# Patient Record
Sex: MALE | Race: WHITE | Employment: FULL TIME | URBAN - METROPOLITAN AREA
[De-identification: names, ages, dates, MRNs, and addresses within clinical notes are randomized per-mention and may not be internally consistent; named-entity substitution may affect disease eponyms.]

---

## 2017-07-10 PROBLEM — F41.1 GENERALIZED ANXIETY DISORDER: Status: ACTIVE | Noted: 2017-07-10

## 2017-09-08 PROBLEM — F33.41 RECURRENT MAJOR DEPRESSIVE DISORDER, IN PARTIAL REMISSION: Status: ACTIVE | Noted: 2017-09-08

## 2017-09-08 PROBLEM — F33.41 RECURRENT MAJOR DEPRESSIVE DISORDER, IN PARTIAL REMISSION (HCC): Status: ACTIVE | Noted: 2017-09-08

## 2019-09-03 NOTE — PROGRESS NOTES
HPI:   Marc Hagen is a 30year old male that presents for Patient presents with:  New Patient: Patient will like to talk about his anxiety.    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 He denies ever having suicidal thoughts  But does have a lot of anxiety and panic attacks and depression. Past medical, surgical, family and social history reviewed in detail with patient.   Updated where appropria rales/rhonchi/wheezing. ABDOMEN:  Soft, nondistended, nontender, bowel sounds normal in all 4 quadrants, no masses, no hepatosplenomegaly. EXTREMITIES:  No edema, no cyanosis, 2+ radial pulses b/l.    NEURO:  Grossly normal   PSYCH: Mood and affect are no

## 2019-09-23 RX ORDER — VENLAFAXINE HYDROCHLORIDE 75 MG/1
75 CAPSULE, EXTENDED RELEASE ORAL DAILY
Qty: 30 CAPSULE | Refills: 0 | Status: SHIPPED | OUTPATIENT
Start: 2019-09-23 | End: 2019-10-23

## 2019-09-23 NOTE — TELEPHONE ENCOUNTER
Venlafaxine HCl ER (EFFEXOR XR) 37.5 MG Oral Capsule SR 24 Hr    Patient states he is out of his medication, has an appt Wed. Should he be off it for 2 days until the appt or can we refill it? Please advise.     Future Appointments   Date Time Provider Jamala

## 2019-09-23 NOTE — TELEPHONE ENCOUNTER
Spoke to pt, states he ran out of his Venlafaxine yesterday afternoon. Last OV 9/3/19  1. Generalized anxiety disorder  2.  Recurrent major depressive disorder, in partial remission ([facility] [address]  Patient was counseled at great length regarding depression and an

## 2019-09-25 ENCOUNTER — TELEPHONE (OUTPATIENT)
Dept: FAMILY MEDICINE CLINIC | Facility: CLINIC | Age: 29
End: 2019-09-25

## 2019-09-30 PROBLEM — I10 ESSENTIAL HYPERTENSION: Status: ACTIVE | Noted: 2019-09-30

## 2019-09-30 NOTE — PROGRESS NOTES
HPI:   Oscar Barriga is a 30year old male that presents for Patient presents with:  Medication Follow-Up: Effexor- 3 week follow up. Has been working out well. Imm/Inj: Declined flu vaccine  Blood Pressure: Will like to get eveluated for it.  He's due f normal without erythema or effusion   Nose: patent, no nasal discharge    Throat:  No tonsillar erythema or exudate. Mouth:  No oral lesions or ulcerations, good dentition. NECK: Supple, no cervical LAD, no thyromegaly.   SKIN: No rashes, no skin lesio

## 2019-10-15 ENCOUNTER — TELEPHONE (OUTPATIENT)
Dept: FAMILY MEDICINE CLINIC | Facility: CLINIC | Age: 29
End: 2019-10-15

## 2019-10-23 ENCOUNTER — OFFICE VISIT (OUTPATIENT)
Dept: FAMILY MEDICINE CLINIC | Facility: CLINIC | Age: 29
End: 2019-10-23
Payer: COMMERCIAL

## 2019-10-23 VITALS
WEIGHT: 204.38 LBS | DIASTOLIC BLOOD PRESSURE: 85 MMHG | OXYGEN SATURATION: 99 % | BODY MASS INDEX: 29.26 KG/M2 | HEIGHT: 70 IN | SYSTOLIC BLOOD PRESSURE: 149 MMHG | RESPIRATION RATE: 16 BRPM | TEMPERATURE: 99 F | HEART RATE: 77 BPM

## 2019-10-23 DIAGNOSIS — Z00.00 ROUTINE PHYSICAL EXAMINATION: ICD-10-CM

## 2019-10-23 DIAGNOSIS — I10 ESSENTIAL HYPERTENSION: Primary | ICD-10-CM

## 2019-10-23 PROCEDURE — 99213 OFFICE O/P EST LOW 20 MIN: CPT | Performed by: FAMILY MEDICINE

## 2019-10-23 RX ORDER — VENLAFAXINE HYDROCHLORIDE 150 MG/1
150 CAPSULE, EXTENDED RELEASE ORAL DAILY
Qty: 30 CAPSULE | Refills: 1 | Status: SHIPPED | OUTPATIENT
Start: 2019-10-23 | End: 2019-12-30

## 2019-10-24 NOTE — PROGRESS NOTES
HPI:   Olamide So is a 30year old male that presents for Patient presents with:  Medication Follow-Up: Irbesartan 75 mg daily. Patient stated he forgot to pick it up the day when it was prescribed. Has only been taking medication for a week.  Is due for scleral icterus, conjunctivae clear, no eye discharge    Ears: External normal. TMs normal without erythema or effusion   Nose: patent, no nasal discharge    Throat:  No tonsillar erythema or exudate.      Mouth:  No oral lesions or ulcerations, good dentit

## 2019-11-15 ENCOUNTER — OFFICE VISIT (OUTPATIENT)
Dept: FAMILY MEDICINE CLINIC | Facility: CLINIC | Age: 29
End: 2019-11-15
Payer: COMMERCIAL

## 2019-11-15 VITALS
BODY MASS INDEX: 28.87 KG/M2 | WEIGHT: 201.63 LBS | RESPIRATION RATE: 16 BRPM | SYSTOLIC BLOOD PRESSURE: 137 MMHG | TEMPERATURE: 99 F | DIASTOLIC BLOOD PRESSURE: 80 MMHG | HEIGHT: 70 IN | HEART RATE: 75 BPM

## 2019-11-15 DIAGNOSIS — F33.41 RECURRENT MAJOR DEPRESSIVE DISORDER, IN PARTIAL REMISSION (HCC): ICD-10-CM

## 2019-11-15 DIAGNOSIS — I10 ESSENTIAL HYPERTENSION: ICD-10-CM

## 2019-11-15 DIAGNOSIS — F41.1 GENERALIZED ANXIETY DISORDER: ICD-10-CM

## 2019-11-15 DIAGNOSIS — Z00.00 ROUTINE ADULT HEALTH MAINTENANCE: Primary | ICD-10-CM

## 2019-11-15 DIAGNOSIS — Z72.89 ALCOHOL USE: ICD-10-CM

## 2019-11-15 PROCEDURE — 99214 OFFICE O/P EST MOD 30 MIN: CPT | Performed by: FAMILY MEDICINE

## 2019-11-15 PROCEDURE — 99395 PREV VISIT EST AGE 18-39: CPT | Performed by: FAMILY MEDICINE

## 2019-11-15 RX ORDER — IRBESARTAN 150 MG/1
150 TABLET ORAL NIGHTLY
Qty: 90 TABLET | Refills: 0 | Status: SHIPPED | OUTPATIENT
Start: 2019-11-15 | End: 2020-02-12

## 2019-11-15 NOTE — PROGRESS NOTES
Maia Mota is a 30year old male who presents for a complete physical exam.   HPI:   Patient presents with:  Physical: Routine annual physical.Has labs pending in chart from 10/23/19  Refill Request:  Irbesartan 75 mg- Patient ran out of medication 4 d 0.10 10*3/uL    nRBC Absolute 0.000 0.000 - 0.012 10*3/uL    Neutrophils % 54.3 %    Lymphocytes % 33.9 %    Monocytes % 8.7 %    Eosinophils % 2.3 %    Basophils % 0.8 %    nRBC/100 WBC 0.00 /100WBC       Current Outpatient Medications   Medication Alfredo Oconnell headaches/dizziness  PSYCH: denies depression or anxiety  HEMATOLOGIC: denies easy bruising/bleeding/anemia/blood clot disorders  ENDOCRINE: denies weight gain/weight loss/enlargement of neck glands/polyuria/polydypsia  ALL/ASTHMA: denies allergic rhinitis 08/30/1993    Deferred                Date(s) Deferred    FLULAVAL 6 months & older 0.5 ml Prefilled syringe ([id number])                          11/15/2019      ASSESSMENT AND PLAN:   Aysha Christiansen is a 30year old male who presents for a complete physical e

## 2019-11-18 PROBLEM — Z72.89 ALCOHOL USE: Status: ACTIVE | Noted: 2019-11-18

## 2019-11-18 PROBLEM — Z78.9 ALCOHOL USE: Status: ACTIVE | Noted: 2019-11-18

## 2019-11-18 PROBLEM — F10.90 ALCOHOL USE: Status: ACTIVE | Noted: 2019-11-18

## 2019-11-25 ENCOUNTER — OFFICE VISIT (OUTPATIENT)
Dept: FAMILY MEDICINE CLINIC | Facility: CLINIC | Age: 29
End: 2019-11-25
Payer: COMMERCIAL

## 2019-11-25 ENCOUNTER — LAB ENCOUNTER (OUTPATIENT)
Dept: LAB | Age: 29
End: 2019-11-25
Attending: FAMILY MEDICINE
Payer: COMMERCIAL

## 2019-11-25 VITALS
HEART RATE: 80 BPM | DIASTOLIC BLOOD PRESSURE: 80 MMHG | RESPIRATION RATE: 16 BRPM | TEMPERATURE: 98 F | WEIGHT: 203.63 LBS | HEIGHT: 70 IN | SYSTOLIC BLOOD PRESSURE: 125 MMHG | BODY MASS INDEX: 29.15 KG/M2

## 2019-11-25 DIAGNOSIS — F41.1 GENERALIZED ANXIETY DISORDER: ICD-10-CM

## 2019-11-25 DIAGNOSIS — I10 ESSENTIAL HYPERTENSION: Primary | ICD-10-CM

## 2019-11-25 DIAGNOSIS — Z00.00 ROUTINE PHYSICAL EXAMINATION: ICD-10-CM

## 2019-11-25 PROCEDURE — 36415 COLL VENOUS BLD VENIPUNCTURE: CPT | Performed by: FAMILY MEDICINE

## 2019-11-25 PROCEDURE — 99214 OFFICE O/P EST MOD 30 MIN: CPT | Performed by: FAMILY MEDICINE

## 2019-11-25 PROCEDURE — 80050 GENERAL HEALTH PANEL: CPT | Performed by: FAMILY MEDICINE

## 2019-11-25 PROCEDURE — 84439 ASSAY OF FREE THYROXINE: CPT | Performed by: FAMILY MEDICINE

## 2019-11-25 PROCEDURE — 80061 LIPID PANEL: CPT | Performed by: FAMILY MEDICINE

## 2019-11-25 NOTE — PROGRESS NOTES
HPI:   Paul Funez is a 30year old male that presents for blood pressure check. The patient is taking Irbesartan 150 mg daily. He took medication early today. He has been fasting today and denies caffeine intake.     The patient refuses flu vaccine cervical LAD, no thyromegaly. SKIN: No rashes, no skin lesion, no bruising, good turgor. HEART:  Regular rate and rhythm, no murmurs, rubs or gallops. LUNGS: Clear to auscultation bilterally, no rales/rhonchi/wheezing.   ABDOMEN:  Soft, nondistended, non

## 2019-12-05 DIAGNOSIS — E78.00 HIGH CHOLESTEROL: Primary | ICD-10-CM

## 2019-12-30 ENCOUNTER — TELEPHONE (OUTPATIENT)
Dept: FAMILY MEDICINE CLINIC | Facility: CLINIC | Age: 29
End: 2019-12-30

## 2019-12-30 RX ORDER — VENLAFAXINE HYDROCHLORIDE 150 MG/1
150 CAPSULE, EXTENDED RELEASE ORAL DAILY
Qty: 30 CAPSULE | Refills: 0 | Status: SHIPPED | OUTPATIENT
Start: 2019-12-30 | End: 2020-02-05

## 2019-12-30 NOTE — TELEPHONE ENCOUNTER
Venlafaxine HCl  MG Oral Capsule SR 24 Hr    [facility] #[id number] - [address] AT [facility], [phone], [phone]  ________________________________________    Pt would like a phone call after it has be

## 2019-12-30 NOTE — TELEPHONE ENCOUNTER
10/23/19 last refill #30 (1)  11/25/19 OV Dr. Max Jones Gen. Anxiety disorder/HTN partial notes:  Generalized anxiety disorder  Controlled. Continue Effexor. 1/15/2020 SCHEDULED OV follow up Dr. Max Jones. Sent RX. Task completed.

## 2020-01-15 ENCOUNTER — TELEPHONE (OUTPATIENT)
Dept: FAMILY MEDICINE CLINIC | Facility: CLINIC | Age: 30
End: 2020-01-15

## 2020-01-15 NOTE — TELEPHONE ENCOUNTER
Patient was a no show for his appt today with Dr. Dylan Spencer. LMOM for patient to call office to reschedule if needed, and to let him know that there will be a $40 no show fee charged for the missed visit.

## 2020-02-05 RX ORDER — VENLAFAXINE HYDROCHLORIDE 150 MG/1
CAPSULE, EXTENDED RELEASE ORAL
Qty: 30 CAPSULE | Refills: 0 | Status: SHIPPED | OUTPATIENT
Start: 2020-02-05 | End: 2020-03-06

## 2020-02-05 NOTE — TELEPHONE ENCOUNTER
A follow-up appointment needed to check blood pressure. Patient has missed his last appointment on 1/15/2020.

## 2020-02-06 NOTE — TELEPHONE ENCOUNTER
Future Appointments   Date Time Provider Ramana Faina   2/8/2020 10:00 AM Javy Leonard MD [facility] [address] [facility] [address]

## 2020-02-12 ENCOUNTER — OFFICE VISIT (OUTPATIENT)
Dept: FAMILY MEDICINE CLINIC | Facility: CLINIC | Age: 30
End: 2020-02-12
Payer: COMMERCIAL

## 2020-02-12 VITALS
BODY MASS INDEX: 28.75 KG/M2 | RESPIRATION RATE: 16 BRPM | TEMPERATURE: 98 F | WEIGHT: 200.81 LBS | HEIGHT: 70 IN | OXYGEN SATURATION: 99 % | DIASTOLIC BLOOD PRESSURE: 86 MMHG | SYSTOLIC BLOOD PRESSURE: 142 MMHG | HEART RATE: 80 BPM

## 2020-02-12 DIAGNOSIS — I10 ESSENTIAL HYPERTENSION: Primary | ICD-10-CM

## 2020-02-12 PROCEDURE — 99213 OFFICE O/P EST LOW 20 MIN: CPT | Performed by: FAMILY MEDICINE

## 2020-02-12 RX ORDER — IRBESARTAN 150 MG/1
150 TABLET ORAL DAILY
Qty: 90 TABLET | Refills: 0 | Status: SHIPPED | OUTPATIENT
Start: 2020-02-12 | End: 2020-04-13

## 2020-02-12 RX ORDER — HYDROCHLOROTHIAZIDE 12.5 MG/1
12.5 TABLET ORAL DAILY
Qty: 30 TABLET | Refills: 0 | Status: SHIPPED | OUTPATIENT
Start: 2020-02-12 | End: 2020-04-13

## 2020-02-12 NOTE — PROGRESS NOTES
HPI:   Dayanna Antonio is a 30year old male that presents for blood pressure check. Pt is here for a 2 month f/u. Pt is currently taking Irbesartan 150 mg qd. Pt was aware BP would be elevated today (156/90). Pt claims to take his medication regularly.  Pt no skin lesion, no bruising, good turgor. HEART:  Regular rate and rhythm, no murmurs, rubs or gallops. LUNGS: Clear to auscultation bilterally, no rales/rhonchi/wheezing.   ABDOMEN:  Soft, nondistended, nontender, bowel sounds normal in all 4 quadrants,

## 2020-03-06 RX ORDER — VENLAFAXINE HYDROCHLORIDE 150 MG/1
CAPSULE, EXTENDED RELEASE ORAL
Qty: 30 CAPSULE | Refills: 0 | Status: SHIPPED | OUTPATIENT
Start: 2020-03-06 | End: 2020-04-13

## 2020-03-06 NOTE — TELEPHONE ENCOUNTER
Name from pharmacy: VENLAFAXINE 150MG ER CAPSULES          Will file in chart as: VENLAFAXINE HCL  MG Oral Capsule SR 24 Hr         Sig: TAKE 1 CAPSULE BY MOUTH DAILY    Disp:  30 capsule    Refills:  0 (Pharmacy requested: Not specified)    Start: 3

## 2020-03-30 ENCOUNTER — TELEPHONE (OUTPATIENT)
Dept: FAMILY MEDICINE CLINIC | Facility: CLINIC | Age: 30
End: 2020-03-30

## 2020-03-30 DIAGNOSIS — Z20.822 EXPOSURE TO 2019 NOVEL CORONAVIRUS: ICD-10-CM

## 2020-03-30 DIAGNOSIS — R05.9 COUGH: Primary | ICD-10-CM
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
## 2020-03-30 NOTE — TELEPHONE ENCOUNTER
- Pt was informed that he may have been exposed to virus at work. Pt works for [employer] in [facility]. Pt did not go to work today and would like to be placed on 14 day quarentine. Please call to advise.   [phone]

## 2020-03-31 ENCOUNTER — TELEPHONE (OUTPATIENT)
Dept: FAMILY MEDICINE CLINIC | Facility: CLINIC | Age: 30
End: 2020-03-31

## 2020-03-31 ENCOUNTER — HOSPITAL ENCOUNTER (EMERGENCY)
Facility: HOSPITAL | Age: 30
Discharge: HOME | End: 2020-03-31
Attending: EMERGENCY MEDICINE
Payer: COMMERCIAL

## 2020-03-31 ENCOUNTER — APPOINTMENT (OUTPATIENT)
Dept: GENERAL RADIOLOGY | Facility: HOSPITAL | Age: 30
End: 2020-03-31
Attending: PHYSICIAN ASSISTANT
Payer: COMMERCIAL

## 2020-03-31 VITALS
DIASTOLIC BLOOD PRESSURE: 99 MMHG | RESPIRATION RATE: 18 BRPM | HEIGHT: 71 IN | OXYGEN SATURATION: 98 % | BODY MASS INDEX: 28 KG/M2 | TEMPERATURE: 98 F | SYSTOLIC BLOOD PRESSURE: 146 MMHG | WEIGHT: 200 LBS | HEART RATE: 104 BPM

## 2020-03-31 DIAGNOSIS — J45.41 MODERATE PERSISTENT ASTHMA WITH EXACERBATION: Primary | ICD-10-CM

## 2020-03-31 DIAGNOSIS — Z20.828 CONTACT WITH OR EXPOSURE TO VIRAL DISEASE: ICD-10-CM

## 2020-03-31 PROCEDURE — 99284 EMERGENCY DEPT VISIT MOD MDM: CPT

## 2020-03-31 PROCEDURE — 71045 X-RAY EXAM CHEST 1 VIEW: CPT | Performed by: PHYSICIAN ASSISTANT

## 2020-03-31 PROCEDURE — 87999 UNLISTED MICROBIOLOGY PX: CPT

## 2020-03-31 PROCEDURE — 99283 EMERGENCY DEPT VISIT LOW MDM: CPT

## 2020-03-31 RX ORDER — DEXAMETHASONE SODIUM PHOSPHATE 4 MG/ML
16 INJECTION, SOLUTION INTRA-ARTICULAR; INTRALESIONAL; INTRAMUSCULAR; INTRAVENOUS; SOFT TISSUE ONCE
Status: COMPLETED | OUTPATIENT
Start: 2020-03-31 | End: 2020-03-31

## 2020-03-31 RX ORDER — ALBUTEROL SULFATE 90 UG/1
2 AEROSOL, METERED RESPIRATORY (INHALATION) EVERY 6 HOURS PRN
COMMUNITY

## 2020-03-31 RX ORDER — ALBUTEROL SULFATE 90 UG/1
2 AEROSOL, METERED RESPIRATORY (INHALATION) EVERY 4 HOURS PRN
Qty: 1 INHALER | Refills: 0 | Status: SHIPPED | OUTPATIENT
Start: 2020-03-31 | End: 2020-04-30

## 2020-03-31 RX ORDER — MONTELUKAST SODIUM 10 MG/1
10 TABLET ORAL NIGHTLY
Qty: 30 TABLET | Refills: 0 | Status: SHIPPED | OUTPATIENT
Start: 2020-03-31

## 2020-03-31 RX ORDER — PREDNISONE 20 MG/1
40 TABLET ORAL DAILY
Qty: 8 TABLET | Refills: 0 | Status: SHIPPED | OUTPATIENT
Start: 2020-03-31 | End: 2020-04-04

## 2020-03-31 RX ORDER — ALBUTEROL SULFATE 90 UG/1
2 AEROSOL, METERED RESPIRATORY (INHALATION) 4 TIMES DAILY
Status: DISCONTINUED | OUTPATIENT
Start: 2020-03-31 | End: 2020-03-31
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
## 2020-03-31 NOTE — TELEPHONE ENCOUNTER
I spoke to patient and explained that just because the doctor ordered the Covid-19 test, that does not mean he will get the test. The order goes to [facility] and they prioritize the patients who do need to be tested.  I gave him the health department number [de-identified]

## 2020-03-31 NOTE — TELEPHONE ENCOUNTER
Pt called to let Dr. Angel Earl know that he would be going for testing today at the hospital.   I transferred the call to [place] to find out more information on the procedure for the testing.       Patient called back and stated that he spoke to someone at [facility],

## 2020-03-31 NOTE — TELEPHONE ENCOUNTER
I spoke to patient who said he called [facility] and they told him to follow what his doctor said, he is going over to [facility] for testing.  I told him they will not just do the test without screening first. He said I am going to follow my doctors orde

## 2020-03-31 NOTE — ED PROVIDER NOTES
Patient Seen in: [facility] Emergency Department      History   Patient presents with:  Cough/URI    Stated Complaint: Pt requesting to be tested for covid. Afebrile, cough for 2 days.  denies travel*    HPI    57-year-old male with a history of asth Resp 18   Ht 180.3 cm (5' 11\")   Wt 90.7 kg   SpO2 98%   BMI 27.89 kg/m²         Physical Exam  Vitals signs and nursing note reviewed. Constitutional:       Appearance: He is well-developed. HENT:      Head: Normocephalic. Jaw:  There is normal effusion, or pneumothorax. CONCLUSION:  No focal consolidation.     Dictated by: Esperanza Ohara MD on 3/31/2020 at 2:42 PM     Finalized by: Esperanza Ohara MD on 3/31/2020 at 2:42 PM            Wearing N95 mask thru out visit          MDM     Clinical Impression:

## 2020-04-02 ENCOUNTER — TELEPHONE (OUTPATIENT)
Dept: FAMILY MEDICINE CLINIC | Facility: CLINIC | Age: 30
End: 2020-04-02

## 2020-04-02 NOTE — TELEPHONE ENCOUNTER
Informed pt of Dr. Guardado Aid letter sent via [address] with return to work of 4/2/20 date. Pt expressed understanding and agreement, but is concerned about missing Monday 3/30/20 work.  Pt states he will bring into work along with Dr. Guardado Aid letter the dis

## 2020-04-02 NOTE — TELEPHONE ENCOUNTER
Pt calling in regards to needing a note to return to work, stated he tested negative for the corona virus , please call pt

## 2020-04-02 NOTE — TELEPHONE ENCOUNTER
See attached phone message. 3/31/20 lab:  Notes recorded by YOBANY Vaughn on 2020 at 6:00 PM CDT  COVID result received and reviewed. Called patient and verified name and . Results are negative.  Informed patient that they should call primary c

## 2020-04-12 RX ORDER — VENLAFAXINE HYDROCHLORIDE 150 MG/1
CAPSULE, EXTENDED RELEASE ORAL
Qty: 30 CAPSULE | Refills: 0 | Status: CANCELLED | OUTPATIENT
Start: 2020-04-12

## 2020-04-13 RX ORDER — VENLAFAXINE HYDROCHLORIDE 150 MG/1
CAPSULE, EXTENDED RELEASE ORAL
Qty: 90 CAPSULE | Refills: 0 | Status: SHIPPED | OUTPATIENT
Start: 2020-04-13 | End: 2020-07-14

## 2020-04-13 RX ORDER — VENLAFAXINE HYDROCHLORIDE 75 MG/1
CAPSULE, EXTENDED RELEASE ORAL
Qty: 30 CAPSULE | Refills: 0 | OUTPATIENT
Start: 2020-04-13

## 2020-04-13 RX ORDER — HYDROCHLOROTHIAZIDE 12.5 MG/1
TABLET ORAL
Qty: 90 TABLET | Refills: 0 | Status: SHIPPED | OUTPATIENT
Start: 2020-04-13

## 2020-04-13 RX ORDER — IRBESARTAN 150 MG/1
TABLET ORAL
Qty: 90 TABLET | Refills: 0 | Status: SHIPPED | OUTPATIENT
Start: 2020-04-13 | End: 2020-07-14

## 2020-04-13 NOTE — TELEPHONE ENCOUNTER
Name from pharmacy: VENLAFAXINE 150MG ER CAPSULES          Will file in chart as: VENLAFAXINE HCL  MG Oral Capsule SR 24 Hr         Sig: TAKE ONE CAPSULE BY MOUTH DAILY    Disp:  30 capsule    Refills:  0 (Pharmacy requested: Not specified)    Start: LOV 2/12/20

## 2020-04-22 ENCOUNTER — VIRTUAL PHONE E/M (OUTPATIENT)
Dept: FAMILY MEDICINE CLINIC | Facility: CLINIC | Age: 30
End: 2020-04-22
Payer: COMMERCIAL

## 2020-04-22 DIAGNOSIS — K92.0 HEMATEMESIS WITH NAUSEA: Primary | ICD-10-CM

## 2020-04-22 PROCEDURE — 99213 OFFICE O/P EST LOW 20 MIN: CPT | Performed by: FAMILY MEDICINE

## 2020-04-22 NOTE — PROGRESS NOTES
Virtual Telephone Check-In    TELEMEDICINE VISIT by phone  This visit is conducted using Telemedicine with live, interactive video and/or audio.     Verification of patient identity was established by the  patient (s)  Manjeet Singleton Oral Tab, Take 1 tablet (10 mg total) by mouth nightly., Disp: 30 tablet, Rfl: 0  •  Albuterol Sulfate  (90 Base) MCG/ACT Inhalation Aero Soln, Inhale 2 puffs into the lungs every 4 (four) hours as needed for Wheezing., Disp: 1 Inhaler, Rfl: 0    RE as detailed in the plan of care above.

## 2020-07-14 RX ORDER — VENLAFAXINE HYDROCHLORIDE 150 MG/1
CAPSULE, EXTENDED RELEASE ORAL
Qty: 90 CAPSULE | Refills: 1 | Status: SHIPPED | OUTPATIENT
Start: 2020-07-14 | End: 2021-01-28

## 2020-07-14 RX ORDER — IRBESARTAN 150 MG/1
TABLET ORAL
Qty: 90 TABLET | Refills: 1 | Status: SHIPPED | OUTPATIENT
Start: 2020-07-14

## 2020-07-14 NOTE — TELEPHONE ENCOUNTER
Hypertension Medications Protocol Passed7/12 3:18 PM   CMP or BMP in past 12 months    Last serum creatinine< 2.0    Appointment in past 6 or next 3 months     Requesting IRBESARTAN 150 MG, VENLAFAXINE HCL  MG   LOV: 4/22/20 (virtual)  RTC:   Last Re

## 2020-07-29 ENCOUNTER — VIRTUAL PHONE E/M (OUTPATIENT)
Dept: FAMILY MEDICINE CLINIC | Facility: CLINIC | Age: 30
End: 2020-07-29

## 2020-07-29 DIAGNOSIS — Z02.9 ADMINISTRATIVE ENCOUNTER: Primary | ICD-10-CM

## 2020-08-10 ENCOUNTER — TELEPHONE (OUTPATIENT)
Dept: FAMILY MEDICINE CLINIC | Facility: CLINIC | Age: 30
End: 2020-08-10

## 2020-08-10 NOTE — TELEPHONE ENCOUNTER
Future Appointments   Date Time Provider Ramana Eisenberg   8/12/2020  4:45 PM Hallie Bruner MD [facility] [address] [facility] [address]     Patient verbally consents to a virtual/telephone check-in service for the date and time noted above.  Patient understands and accepts

## 2020-08-12 ENCOUNTER — VIRTUAL PHONE E/M (OUTPATIENT)
Dept: FAMILY MEDICINE CLINIC | Facility: CLINIC | Age: 30
End: 2020-08-12

## 2020-08-12 DIAGNOSIS — G47.00 INSOMNIA, UNSPECIFIED TYPE: ICD-10-CM

## 2020-08-12 DIAGNOSIS — R19.7 DIARRHEA, UNSPECIFIED TYPE: ICD-10-CM

## 2020-08-12 DIAGNOSIS — R11.2 NON-INTRACTABLE VOMITING WITH NAUSEA, UNSPECIFIED VOMITING TYPE: Primary | ICD-10-CM

## 2020-08-12 PROCEDURE — 99442 PHONE E/M BY PHYS 11-20 MIN: CPT | Performed by: FAMILY MEDICINE

## 2020-08-12 RX ORDER — CIPROFLOXACIN 500 MG/1
500 TABLET, FILM COATED ORAL 2 TIMES DAILY
Qty: 6 TABLET | Refills: 0 | Status: SHIPPED | OUTPATIENT
Start: 2020-08-12 | End: 2020-08-15

## 2020-08-12 RX ORDER — ONDANSETRON 4 MG/1
4 TABLET, ORALLY DISINTEGRATING ORAL EVERY 8 HOURS PRN
Qty: 6 TABLET | Refills: 0 | Status: SHIPPED | OUTPATIENT
Start: 2020-08-12

## 2020-08-12 NOTE — PROGRESS NOTES
TELEMEDICINE VISIT by phone  This visit is conducted using Telemedicine with live, interactive audio.      Verification of patient identity was established by the  patient (s)  Radha Roth verbally consents to a telemedici Disp: 90 tablet, Rfl: 0  •  Albuterol Sulfate  (90 Base) MCG/ACT Inhalation Aero Soln, Inhale 2 puffs into the lungs every 6 (six) hours as needed for Wheezing., Disp: , Rfl:   •  Montelukast Sodium (SINGULAIR) 10 MG Oral Tab, Take 1 tablet (10 mg t

## 2021-01-28 NOTE — TELEPHONE ENCOUNTER
Medication Quantity Refills Start End   VENLAFAXINE HCL  MG Oral Capsule SR 24 Hr 90 capsule 1 7/14/2020      Virtual phone visit 8/12/20   No future appointments. Please advise.  Thank you

## 2021-01-28 NOTE — TELEPHONE ENCOUNTER
VENLAFAXINE HCL  MG Oral Capsule SR 24 Hr    [facility] #[id number] - [place] [address] AT [address] [place] [address], [phone], [phone]  __________________________________    Pt states he has 2 left.

## 2021-01-29 RX ORDER — VENLAFAXINE HYDROCHLORIDE 150 MG/1
150 CAPSULE, EXTENDED RELEASE ORAL DAILY
Qty: 90 CAPSULE | Refills: 0 | Status: SHIPPED | OUTPATIENT
Start: 2021-01-29 | End: 2021-05-14

## 2021-05-13 DIAGNOSIS — F41.1 GENERALIZED ANXIETY DISORDER: ICD-10-CM

## 2021-05-13 DIAGNOSIS — Z00.00 ROUTINE ADULT HEALTH MAINTENANCE: Primary | ICD-10-CM

## 2021-05-13 NOTE — TELEPHONE ENCOUNTER
VENLAFAXINE HCL  MG Oral Capsule SR 24 Hr     [facility] #[id number] - [place], [address] [place] [address] AT [address] [place] [address], [phone], [phone]  __________________________________     · Pt states he has one last dose left.    · Pha

## 2021-05-14 RX ORDER — VENLAFAXINE HYDROCHLORIDE 150 MG/1
150 CAPSULE, EXTENDED RELEASE ORAL DAILY
Qty: 90 CAPSULE | Refills: 0 | Status: SHIPPED | OUTPATIENT
Start: 2021-05-14

## 2021-05-14 NOTE — TELEPHONE ENCOUNTER
Venlafaxine HCl  MG Oral Capsule SR 24 Hr         Sig: Take 1 capsule (150 mg total) by mouth daily.     Disp:  90 capsule    Refills:  0    Start: 5/13/2021    Class: Normal    Non-formulary    Last ordered: 3 months ago by Travon Justice MD

## 2021-05-14 NOTE — TELEPHONE ENCOUNTER
Effexor XR refilled    Patient is due for physical and fasting blood work.   Blood work is ordered and after he does have follow-up for physical.

## 2021-06-11 ENCOUNTER — OFFICE VISIT (OUTPATIENT)
Dept: OCCUPATIONAL MEDICINE | Age: 31
End: 2021-06-11
Attending: PHYSICIAN ASSISTANT

## 2021-06-23 PROBLEM — I10 HYPERTENSION: Status: ACTIVE | Noted: 2019-09-30

## 2022-02-08 PROBLEM — F41.9 ANXIETY: Status: ACTIVE | Noted: 2022-02-08

## 2022-02-08 PROBLEM — F10.10 ALCOHOL ABUSE: Status: ACTIVE | Noted: 2022-02-08

## 2022-06-27 ENCOUNTER — OFFICE VISIT (OUTPATIENT)
Dept: FAMILY MEDICINE CLINIC | Facility: CLINIC | Age: 32
End: 2022-06-27
Payer: COMMERCIAL

## 2022-06-27 VITALS
SYSTOLIC BLOOD PRESSURE: 110 MMHG | DIASTOLIC BLOOD PRESSURE: 68 MMHG | BODY MASS INDEX: 27.72 KG/M2 | WEIGHT: 198 LBS | OXYGEN SATURATION: 99 % | TEMPERATURE: 98 F | HEIGHT: 71 IN | RESPIRATION RATE: 18 BRPM | HEART RATE: 75 BPM

## 2022-06-27 DIAGNOSIS — F32.A ANXIETY AND DEPRESSION: ICD-10-CM

## 2022-06-27 DIAGNOSIS — E55.9 VITAMIN D DEFICIENCY: ICD-10-CM

## 2022-06-27 DIAGNOSIS — E78.00 HYPERCHOLESTEROLEMIA: ICD-10-CM

## 2022-06-27 DIAGNOSIS — F41.9 ANXIETY AND DEPRESSION: ICD-10-CM

## 2022-06-27 DIAGNOSIS — N52.9 ERECTILE DYSFUNCTION, UNSPECIFIED ERECTILE DYSFUNCTION TYPE: ICD-10-CM

## 2022-06-27 DIAGNOSIS — Z00.00 WELLNESS EXAMINATION: Primary | ICD-10-CM

## 2022-06-27 PROBLEM — F10.10 ALCOHOL ABUSE: Status: RESOLVED | Noted: 2022-02-08 | Resolved: 2022-06-27

## 2022-06-27 PROBLEM — I10 HYPERTENSION: Status: RESOLVED | Noted: 2019-09-30 | Resolved: 2022-06-27

## 2022-06-27 PROBLEM — Z72.89 ALCOHOL USE: Status: RESOLVED | Noted: 2019-11-18 | Resolved: 2022-06-27

## 2022-06-27 PROBLEM — Z78.9 ALCOHOL USE: Status: RESOLVED | Noted: 2019-11-18 | Resolved: 2022-06-27

## 2022-06-27 PROBLEM — F10.90 ALCOHOL USE: Status: RESOLVED | Noted: 2019-11-18 | Resolved: 2022-06-27

## 2022-06-27 RX ORDER — FLUOXETINE 10 MG/1
10 CAPSULE ORAL DAILY
Qty: 90 CAPSULE | Refills: 0 | Status: SHIPPED | OUTPATIENT
Start: 2022-06-27

## 2022-06-27 RX ORDER — MELATONIN
1 DAILY
COMMUNITY
Start: 2021-07-27 | End: 2022-06-27

## 2022-06-27 RX ORDER — NALTREXONE HYDROCHLORIDE 50 MG/1
1 TABLET, FILM COATED ORAL DAILY
COMMUNITY
Start: 2021-07-27 | End: 2022-06-27

## 2022-06-27 RX ORDER — GABAPENTIN 100 MG/1
1 CAPSULE ORAL 3 TIMES DAILY
COMMUNITY
Start: 2021-07-27 | End: 2022-06-27

## 2022-06-27 RX ORDER — FOLIC ACID 1 MG/1
1 TABLET ORAL DAILY
COMMUNITY
Start: 2021-07-27 | End: 2022-06-27

## 2022-07-05 ENCOUNTER — TELEPHONE (OUTPATIENT)
Dept: FAMILY MEDICINE CLINIC | Facility: CLINIC | Age: 32
End: 2022-07-05
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
## 2022-07-08 ENCOUNTER — TELEPHONE (OUTPATIENT)
Dept: FAMILY MEDICINE CLINIC | Facility: CLINIC | Age: 32
End: 2022-07-08
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
## 2022-07-11 ENCOUNTER — OFFICE VISIT (OUTPATIENT)
Dept: FAMILY MEDICINE CLINIC | Facility: CLINIC | Age: 32
End: 2022-07-11
Payer: OTHER MISCELLANEOUS

## 2022-07-11 VITALS
HEIGHT: 71 IN | WEIGHT: 198 LBS | RESPIRATION RATE: 18 BRPM | TEMPERATURE: 98 F | BODY MASS INDEX: 27.72 KG/M2 | SYSTOLIC BLOOD PRESSURE: 112 MMHG | DIASTOLIC BLOOD PRESSURE: 70 MMHG | OXYGEN SATURATION: 100 % | HEART RATE: 85 BPM

## 2022-07-11 DIAGNOSIS — T24.211A PARTIAL THICKNESS BURN OF RIGHT THIGH, INITIAL ENCOUNTER: ICD-10-CM

## 2022-07-11 DIAGNOSIS — T30.4 CHEMICAL BURN: Primary | ICD-10-CM

## 2022-07-11 DIAGNOSIS — T25.221A PARTIAL THICKNESS BURN OF RIGHT FOOT, INITIAL ENCOUNTER: ICD-10-CM

## 2022-07-11 PROBLEM — R50.9 FEVER: Status: ACTIVE | Noted: 2022-07-11

## 2022-07-11 PROBLEM — F19.10 DRUG ABUSE (HCC): Status: ACTIVE | Noted: 2022-07-11

## 2022-07-11 PROBLEM — T25.029A BURN OF FOOT: Status: ACTIVE | Noted: 2022-07-11

## 2022-07-12 ENCOUNTER — TELEPHONE (OUTPATIENT)
Dept: FAMILY MEDICINE CLINIC | Facility: CLINIC | Age: 32
End: 2022-07-12

## 2022-07-12 NOTE — TELEPHONE ENCOUNTER
Home Health requesting additional script for the Silvadene Cream.  States they need a larger jar than the 50gm. I did pend 400gm jar.
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
## 2022-07-14 ENCOUNTER — TELEPHONE (OUTPATIENT)
Dept: FAMILY MEDICINE CLINIC | Facility: CLINIC | Age: 32
End: 2022-07-14

## 2022-07-15 NOTE — TELEPHONE ENCOUNTER
PA received for SSD cream. Per [facility] pharmacist, this is a workman's comp claim, Stephanie Urbina requested authorization from payer, nothing to be done on PCP's end.

## 2022-08-04 ENCOUNTER — TELEPHONE (OUTPATIENT)
Dept: FAMILY MEDICINE CLINIC | Facility: CLINIC | Age: 32
End: 2022-08-04

## 2022-08-04 NOTE — TELEPHONE ENCOUNTER
[facility] which were signed and faxed back.

## 2022-08-29 NOTE — PATIENT INSTRUCTIONS
Increase your Fluoxitine to 10 mg 2 tablets daily. Go for your fasting blood tests. Do not eat or drink except for water for at least 8 hours prior to the blood tests. Continue with your physical therapy. Keep your appointment with Dr. Marisa Mendoza as scheduled on 9/3/2022. Daily Clayton may contact you for more information in order to process the referral.    Make a video visit with me in 4 weeks for recheck on your anxiety.

## 2022-08-30 ENCOUNTER — TELEPHONE (OUTPATIENT)
Dept: FAMILY MEDICINE CLINIC | Facility: CLINIC | Age: 32
End: 2022-08-30

## 2022-08-30 RX ORDER — FLUOXETINE HYDROCHLORIDE 20 MG/1
20 CAPSULE ORAL DAILY
Qty: 90 CAPSULE | Refills: 0 | Status: SHIPPED | OUTPATIENT
Start: 2022-08-30

## 2022-12-08 RX ORDER — FLUOXETINE HYDROCHLORIDE 20 MG/1
20 CAPSULE ORAL DAILY
Qty: 90 CAPSULE | Refills: 0 | Status: SHIPPED | OUTPATIENT
Start: 2022-12-08

## 2022-12-08 NOTE — TELEPHONE ENCOUNTER
Patient should make a follow up appointment with me either in office or telehealth regarding his anxiety and depression. I did renew his prescription.

## 2023-03-09 ENCOUNTER — PATIENT MESSAGE (OUTPATIENT)
Dept: FAMILY MEDICINE CLINIC | Facility: CLINIC | Age: 33
End: 2023-03-09

## 2023-03-09 RX ORDER — FLUOXETINE HYDROCHLORIDE 20 MG/1
20 CAPSULE ORAL DAILY
Qty: 30 CAPSULE | Refills: 0 | Status: SHIPPED | OUTPATIENT
Start: 2023-03-09

## 2023-03-09 NOTE — TELEPHONE ENCOUNTER
LM for pt to provide new insurance information and to schedule f/up appt with Dr. Simón Boucher. Pt informed he could call or schedule and upload insurance thru [address]

## 2023-03-13 NOTE — TELEPHONE ENCOUNTER
From: Ene Greco  To:  Braden Fontenot DO  Sent: 3/9/2023 2:22 PM CST  Subject: Insurance     Attached is temporary insurance information for [employer]

## 2023-04-06 PROBLEM — L91.0 KELOID SCAR: Status: ACTIVE | Noted: 2022-11-03

## 2023-04-07 PROBLEM — N52.9 ERECTILE DYSFUNCTION: Status: ACTIVE | Noted: 2023-04-07

## 2023-04-07 PROBLEM — K92.0 HEMATEMESIS WITH NAUSEA: Status: RESOLVED | Noted: 2020-04-22 | Resolved: 2023-04-07

## 2023-04-27 ENCOUNTER — TELEPHONE (OUTPATIENT)
Dept: FAMILY MEDICINE CLINIC | Facility: CLINIC | Age: 33
End: 2023-04-27

## 2023-04-27 NOTE — TELEPHONE ENCOUNTER
Received fax on 04/27/2023 requesting medical records. Requesting medical records for all medical records from 08/15/1988 to present. Name:Abiel Moreno Asa Altes and [facility] [employer]  Address: [address] [place] [address]: [phone]  Fax: [phone]    Original sent to [employer], copy sent to medical records.  Red Tag # [id number]    Check # [id number]

## 2023-05-03 ENCOUNTER — MED REC SCAN ONLY (OUTPATIENT)
Dept: FAMILY MEDICINE CLINIC | Facility: CLINIC | Age: 33
End: 2023-05-03

## 2023-06-09 DIAGNOSIS — F41.1 GENERALIZED ANXIETY DISORDER: ICD-10-CM

## 2023-06-09 RX ORDER — BUSPIRONE HYDROCHLORIDE 10 MG/1
TABLET ORAL
Qty: 90 TABLET | Refills: 0 | Status: SHIPPED | OUTPATIENT
Start: 2023-06-09

## 2023-06-27 DIAGNOSIS — F41.1 GENERALIZED ANXIETY DISORDER: ICD-10-CM

## 2023-06-27 DIAGNOSIS — F33.41 RECURRENT MAJOR DEPRESSIVE DISORDER, IN PARTIAL REMISSION (HCC): ICD-10-CM

## 2023-06-29 RX ORDER — FLUOXETINE HYDROCHLORIDE 20 MG/1
CAPSULE ORAL
Qty: 90 CAPSULE | Refills: 0 | Status: SHIPPED | OUTPATIENT
Start: 2023-06-29

## 2023-07-22 DIAGNOSIS — F41.1 GENERALIZED ANXIETY DISORDER: ICD-10-CM

## 2023-07-22 RX ORDER — BUSPIRONE HYDROCHLORIDE 15 MG/1
15 TABLET ORAL 3 TIMES DAILY
Qty: 90 TABLET | Refills: 0 | Status: SHIPPED | OUTPATIENT
Start: 2023-07-22

## 2023-07-31 ENCOUNTER — OFFICE VISIT (OUTPATIENT)
Dept: FAMILY MEDICINE CLINIC | Facility: CLINIC | Age: 33
End: 2023-07-31
Payer: MEDICAID

## 2023-07-31 ENCOUNTER — LAB ENCOUNTER (OUTPATIENT)
Dept: LAB | Age: 33
End: 2023-07-31
Attending: FAMILY MEDICINE
Payer: MEDICAID

## 2023-07-31 VITALS
SYSTOLIC BLOOD PRESSURE: 130 MMHG | DIASTOLIC BLOOD PRESSURE: 80 MMHG | WEIGHT: 218.38 LBS | OXYGEN SATURATION: 98 % | HEIGHT: 71 IN | RESPIRATION RATE: 20 BRPM | BODY MASS INDEX: 30.57 KG/M2 | HEART RATE: 61 BPM

## 2023-07-31 DIAGNOSIS — F41.1 GENERALIZED ANXIETY DISORDER: ICD-10-CM

## 2023-07-31 DIAGNOSIS — E78.00 HYPERCHOLESTEROLEMIA: ICD-10-CM

## 2023-07-31 DIAGNOSIS — E55.9 VITAMIN D DEFICIENCY: ICD-10-CM

## 2023-07-31 DIAGNOSIS — Z13.29 SCREENING FOR THYROID DISORDER: ICD-10-CM

## 2023-07-31 DIAGNOSIS — N52.9 ERECTILE DYSFUNCTION, UNSPECIFIED ERECTILE DYSFUNCTION TYPE: ICD-10-CM

## 2023-07-31 DIAGNOSIS — E78.2 MIXED HYPERLIPIDEMIA: ICD-10-CM

## 2023-07-31 DIAGNOSIS — Z00.00 WELLNESS EXAMINATION: Primary | ICD-10-CM

## 2023-07-31 DIAGNOSIS — F33.41 RECURRENT MAJOR DEPRESSIVE DISORDER, IN PARTIAL REMISSION (HCC): ICD-10-CM

## 2023-07-31 LAB
CHOLEST SERPL-MCNC: 249 MG/DL (ref ?–200)
FASTING PATIENT LIPID ANSWER: YES
HDLC SERPL-MCNC: 43 MG/DL (ref 40–59)
LDLC SERPL CALC-MCNC: 166 MG/DL (ref ?–100)
NONHDLC SERPL-MCNC: 206 MG/DL (ref ?–130)
TRIGL SERPL-MCNC: 213 MG/DL (ref 30–149)
TSI SER-ACNC: 1.82 MIU/ML (ref 0.36–3.74)
VIT D+METAB SERPL-MCNC: 29.9 NG/ML (ref 30–100)
VLDLC SERPL CALC-MCNC: 43 MG/DL (ref 0–30)

## 2023-07-31 PROCEDURE — 3075F SYST BP GE 130 - 139MM HG: CPT | Performed by: FAMILY MEDICINE

## 2023-07-31 PROCEDURE — 84443 ASSAY THYROID STIM HORMONE: CPT

## 2023-07-31 PROCEDURE — 84402 ASSAY OF FREE TESTOSTERONE: CPT

## 2023-07-31 PROCEDURE — 3008F BODY MASS INDEX DOCD: CPT | Performed by: FAMILY MEDICINE

## 2023-07-31 PROCEDURE — 82306 VITAMIN D 25 HYDROXY: CPT

## 2023-07-31 PROCEDURE — 80053 COMPREHEN METABOLIC PANEL: CPT

## 2023-07-31 PROCEDURE — 99395 PREV VISIT EST AGE 18-39: CPT | Performed by: FAMILY MEDICINE

## 2023-07-31 PROCEDURE — 84403 ASSAY OF TOTAL TESTOSTERONE: CPT

## 2023-07-31 PROCEDURE — 3079F DIAST BP 80-89 MM HG: CPT | Performed by: FAMILY MEDICINE

## 2023-07-31 PROCEDURE — 99214 OFFICE O/P EST MOD 30 MIN: CPT | Performed by: FAMILY MEDICINE

## 2023-07-31 PROCEDURE — 36415 COLL VENOUS BLD VENIPUNCTURE: CPT

## 2023-07-31 PROCEDURE — 80061 LIPID PANEL: CPT

## 2023-07-31 NOTE — PATIENT INSTRUCTIONS
Continue your medications as prescribed. Recommendations for exercise are 3-5 times weekly for 30-60 minutes for a minimum of 150-300 minutes. For premenopausal women and men, 1000 mg of calcium daily is recommended. For postmenopausal women, 1200 mg of calcium daily is recommended. To help the body absorb and use calcium, vitamin D 2000 international units daily is recommended. I will contact you with your test results once available.

## 2023-08-01 DIAGNOSIS — E78.2 MIXED HYPERLIPIDEMIA: Primary | ICD-10-CM

## 2023-08-01 DIAGNOSIS — R73.9 HYPERGLYCEMIA: Primary | ICD-10-CM

## 2023-08-01 PROBLEM — E55.9 VITAMIN D INSUFFICIENCY: Status: ACTIVE | Noted: 2023-08-01

## 2023-08-01 LAB
ALBUMIN SERPL-MCNC: 3.8 G/DL (ref 3.4–5)
ALBUMIN/GLOB SERPL: 1 {RATIO} (ref 1–2)
ALP LIVER SERPL-CCNC: 90 U/L
ALT SERPL-CCNC: 34 U/L
ANION GAP SERPL CALC-SCNC: 7 MMOL/L (ref 0–18)
AST SERPL-CCNC: 21 U/L (ref 15–37)
BILIRUB SERPL-MCNC: 0.6 MG/DL (ref 0.1–2)
BUN BLD-MCNC: 9 MG/DL (ref 7–18)
CALCIUM BLD-MCNC: 8.9 MG/DL (ref 8.5–10.1)
CHLORIDE SERPL-SCNC: 109 MMOL/L (ref 98–112)
CO2 SERPL-SCNC: 24 MMOL/L (ref 21–32)
CREAT BLD-MCNC: 1.14 MG/DL
EGFRCR SERPLBLD CKD-EPI 2021: 87 ML/MIN/1.73M2 (ref 60–?)
GLOBULIN PLAS-MCNC: 3.9 G/DL (ref 2.8–4.4)
GLUCOSE BLD-MCNC: 128 MG/DL (ref 70–99)
OSMOLALITY SERPL CALC.SUM OF ELEC: 290 MOSM/KG (ref 275–295)
POTASSIUM SERPL-SCNC: 3.5 MMOL/L (ref 3.5–5.1)
PROT SERPL-MCNC: 7.7 G/DL (ref 6.4–8.2)
SODIUM SERPL-SCNC: 140 MMOL/L (ref 136–145)

## 2023-08-01 RX ORDER — ATORVASTATIN CALCIUM 20 MG/1
20 TABLET, FILM COATED ORAL NIGHTLY
Qty: 90 TABLET | Refills: 0 | Status: SHIPPED | OUTPATIENT
Start: 2023-08-01

## 2023-08-06 LAB
FREE TESTOST DIRECT: 6.9 PG/ML
TESTOSTERONE: 236 NG/DL

## 2023-08-07 DIAGNOSIS — R79.89 LOW TESTOSTERONE IN MALE: Primary | ICD-10-CM

## 2023-08-26 DIAGNOSIS — F41.1 GENERALIZED ANXIETY DISORDER: ICD-10-CM

## 2023-08-30 RX ORDER — BUSPIRONE HYDROCHLORIDE 15 MG/1
15 TABLET ORAL 3 TIMES DAILY
Qty: 270 TABLET | Refills: 1 | Status: SHIPPED | OUTPATIENT
Start: 2023-08-30

## 2023-09-26 DIAGNOSIS — F33.41 RECURRENT MAJOR DEPRESSIVE DISORDER, IN PARTIAL REMISSION (HCC): ICD-10-CM

## 2023-09-26 DIAGNOSIS — F41.1 GENERALIZED ANXIETY DISORDER: ICD-10-CM

## 2023-09-27 RX ORDER — FLUOXETINE HYDROCHLORIDE 20 MG/1
20 CAPSULE ORAL DAILY
Qty: 90 CAPSULE | Refills: 0 | Status: SHIPPED | OUTPATIENT
Start: 2023-09-27

## 2023-09-27 NOTE — TELEPHONE ENCOUNTER
Medication(s) to Refill:   Requested Prescriptions     Pending Prescriptions Disp Refills    FLUOXETINE 20 MG Oral Cap [Pharmacy Med Name: FLUOXETINE 20MG CAPSULES] 90 capsule 0     Sig: TAKE 1 CAPSULE(20 MG) BY MOUTH DAILY     Last Time Medication was Filled:  6/29/23    Recent Visits  Date Type Provider Dept   07/31/23 Office Visit DO Hernesto Foster [facility]     Future Appointments  No visits were found

## 2023-10-21 DIAGNOSIS — F41.1 GENERALIZED ANXIETY DISORDER: ICD-10-CM

## 2023-10-23 ENCOUNTER — PATIENT MESSAGE (OUTPATIENT)
Dept: FAMILY MEDICINE CLINIC | Facility: CLINIC | Age: 33
End: 2023-10-23

## 2023-10-23 RX ORDER — BUSPIRONE HYDROCHLORIDE 15 MG/1
15 TABLET ORAL 3 TIMES DAILY
Qty: 270 TABLET | Refills: 1 | Status: SHIPPED | OUTPATIENT
Start: 2023-10-23

## 2023-10-23 NOTE — TELEPHONE ENCOUNTER
Medication(s) to Refill:   Requested Prescriptions     Pending Prescriptions Disp Refills    busPIRone 15 MG Oral Tab 270 tablet 1     Sig: Take 1 tablet (15 mg total) by mouth 3 (three) times daily.      Last Time Medication was Filled:  8/30/23    Recent Visits  Date Type Provider Dept   07/31/23 Office Visit DO Hernesto Clay [address] [place] [address]     Future Appointments  No visits were found

## 2023-10-24 NOTE — TELEPHONE ENCOUNTER
From: Gorge Khoury  To: Robyn Arellano  Sent: 10/23/2023 12:03 PM CDT  Subject: Curly Taveras [facility] all is going well for you. I have a specialist appointment on the 31st Oct for my testosterone levels from the last blood draw. While I am there the lab shouldn't be too far. Would you like me to get blood draw to see where we are at since the last one? If so, just let me know and I'll swing by and get it done the same day. Thank you Happy Halloween!

## 2023-10-31 ENCOUNTER — OFFICE VISIT (OUTPATIENT)
Dept: SURGERY | Facility: CLINIC | Age: 33
End: 2023-10-31

## 2023-10-31 ENCOUNTER — LAB ENCOUNTER (OUTPATIENT)
Dept: LAB | Age: 33
End: 2023-10-31
Attending: SURGERY
Payer: MEDICAID

## 2023-10-31 DIAGNOSIS — R79.89 LOW TESTOSTERONE IN MALE: ICD-10-CM

## 2023-10-31 DIAGNOSIS — N52.9 ERECTILE DYSFUNCTION OF ORGANIC ORIGIN: ICD-10-CM

## 2023-10-31 DIAGNOSIS — R79.89 LOW TESTOSTERONE IN MALE: Primary | ICD-10-CM

## 2023-10-31 LAB
APPEARANCE: CLEAR
BILIRUBIN: NEGATIVE
ERYTHROCYTE [DISTWIDTH] IN BLOOD BY AUTOMATED COUNT: 12.3 %
ESTRADIOL SERPL-MCNC: 27.9 PG/ML
FSH SERPL-ACNC: 1.8 MIU/ML
GLUCOSE (URINE DIPSTICK): NEGATIVE MG/DL
HCT VFR BLD AUTO: 44.2 %
HGB BLD-MCNC: 15.5 G/DL
KETONES (URINE DIPSTICK): NEGATIVE MG/DL
LEUKOCYTES: NEGATIVE
LH SERPL-ACNC: 2.7 MIU/ML
MCH RBC QN AUTO: 28.4 PG (ref 26–34)
MCHC RBC AUTO-ENTMCNC: 35.1 G/DL (ref 31–37)
MCV RBC AUTO: 81.1 FL
MULTISTIX LOT#: ABNORMAL NUMERIC
NITRITE, URINE: NEGATIVE
PH, URINE: 5.5 (ref 4.5–8)
PLATELET # BLD AUTO: 268 10(3)UL (ref 150–450)
PROLACTIN SERPL-MCNC: 7.6 NG/ML
PROTEIN (URINE DIPSTICK): NEGATIVE MG/DL
RBC # BLD AUTO: 5.45 X10(6)UL
SPECIFIC GRAVITY: <=1.005 (ref 1–1.03)
TESTOST SERPL-MCNC: 382.63 NG/DL
URINE-COLOR: YELLOW
UROBILINOGEN,SEMI-QN: 0.2 MG/DL (ref 0–1.9)
WBC # BLD AUTO: 8.4 X10(3) UL (ref 4–11)

## 2023-10-31 PROCEDURE — 99244 OFF/OP CNSLTJ NEW/EST MOD 40: CPT | Performed by: SURGERY

## 2023-10-31 PROCEDURE — 85027 COMPLETE CBC AUTOMATED: CPT | Performed by: SURGERY

## 2023-10-31 PROCEDURE — 82670 ASSAY OF TOTAL ESTRADIOL: CPT | Performed by: SURGERY

## 2023-10-31 PROCEDURE — 84146 ASSAY OF PROLACTIN: CPT

## 2023-10-31 PROCEDURE — 84403 ASSAY OF TOTAL TESTOSTERONE: CPT | Performed by: SURGERY

## 2023-10-31 PROCEDURE — 36415 COLL VENOUS BLD VENIPUNCTURE: CPT | Performed by: SURGERY

## 2023-10-31 PROCEDURE — 83002 ASSAY OF GONADOTROPIN (LH): CPT

## 2023-10-31 PROCEDURE — 83001 ASSAY OF GONADOTROPIN (FSH): CPT

## 2023-10-31 PROCEDURE — 81003 URINALYSIS AUTO W/O SCOPE: CPT | Performed by: SURGERY

## 2023-10-31 RX ORDER — TADALAFIL 20 MG/1
20 TABLET ORAL
Qty: 30 TABLET | Refills: 5 | Status: SHIPPED | OUTPATIENT
Start: 2023-10-31

## 2023-10-31 NOTE — PROGRESS NOTES
Urology Clinic Note - New Patient    Referring Provider:  No referring provider defined for this encounter. Primary Care Provider:  Edna Molina DO     Chief Complaint:   Testosterone deficiency, ED    HPI:   Malu Alarcon is a 26year old male with history of HTN, anxiety, depression, prior EtOH abuse (quit 2 years ago), prior robotic left pyeloplasty referred for testosterone deficiency and ED. His most recent testosterone on 7/31/2023 was 236. Prior to that on 2/26/2022 testosterone was 371. Most recent hematocrit on 2/26/2022 was 44.4. For the past 2 years since being sober he has noticed progressive erectile dysfunction, low libido, low energy. He just recently got  and he has been struggling to have sex. He does note mild dorsal penile curvature that does not prevent penetration. He does have 2 children 1 from a prior relationship and 1 from his current relationship, and he is interested in potentially having more kids. For his ED he has tried Cialis 10 mg as needed and Viagra unknown dose with no success. AUA symptom score is 1/delighted with LUTS.     Gross hematuria: Denies    Urinalysis (clinic dip): Negative    Lab Results   Component Value Date    TESTOSFREE 9.3 02/26/2022       No results found for: \"PSA\", \"PERCENTPSA\", \"PSAS\", \"PSAULTRA\"     History:     Past Medical History:   Diagnosis Date    Alcohol abuse 2/8/2022    Anxiety     Asthma     Depression     Essential hypertension        Past Surgical History:   Procedure Laterality Date    KIDNEY SURGERY Left     Robotic Pyeloplasty for hydronephrosis    TONSILLECTOMY         Family History   Problem Relation Age of Onset    Diabetes Father     Hypertension Mother     Heart Disorder Paternal Grandmother        Social History     Socioeconomic History    Marital status:    Tobacco Use    Smoking status: Never    Smokeless tobacco: Current     Types: Chew    Tobacco comments:     Patient aware of increased risk of oral cancer with chew tobacco.   Vaping Use    Vaping Use: Never used   Substance and Sexual Activity    Alcohol use: Not Currently     Comment: recovering ETOH    Drug use: No    Sexual activity: Yes   Other Topics Concern    Caffeine Concern No    Exercise No    Seat Belt No    Special Diet No    Stress Concern Yes    Weight Concern No       Medications (Active prior to today's visit):  Current Outpatient Medications   Medication Sig Dispense Refill    busPIRone 15 MG Oral Tab Take 1 tablet (15 mg total) by mouth 3 (three) times daily. 270 tablet 1    FLUoxetine 20 MG Oral Cap Take 1 capsule (20 mg total) by mouth daily. 90 capsule 0    atorvastatin 20 MG Oral Tab Take 1 tablet (20 mg total) by mouth nightly. 90 tablet 0    Tadalafil 10 MG Oral Tab Take 1 tablet (10 mg total) by mouth daily as needed for Erectile Dysfunction. 30 tablet 0       Allergies:    Cephalexin              HIVES  Amoxil  [Amoxicilli*      Keflex                    Penicillins               Cephalosporins          RASH  Vancomycin              RASH    Review of Systems:   A comprehensive 10-point review of systems was completed. Pertinent positives and negatives are noted in the the HPI. Physical Exam:   CONSTITUTIONAL: Well developed, well nourished, in no acute distress  NEUROLOGIC: Alert and oriented  HEAD: Normocephalic, atraumatic  EYES: Sclera non-icteric  ENT: Hearing intact, moist mucous membranes  NECK: No obvious goiter or masses  RESPIRATORY: Normal respiratory effort  SKIN: No evident rashes  ABDOMEN: Soft, non-tender, non-distended  GENITOURINARY: Normal phallus, orthotopic meatus, normal bilateral testicles    Assessment & Plan:   Devin Velasquez is a 26year old male with history of HTN, anxiety, depression, prior EtOH abuse (quit 2 years ago), prior robotic left pyeloplasty referred for testosterone deficiency and ED. His most recent testosterone on 7/31/2023 was 236. Prior to that on 2/26/2022 testosterone was 371. Most recent hematocrit on 2/26/2022 was 44.4. For the past 2 years since being sober he has noticed progressive erectile dysfunction, low libido, low energy. He just recently got  and he has been struggling to have sex. He does note mild dorsal penile curvature that does not prevent penetration. He does have 2 children 1 from a prior relationship and 1 from his current relationship, and he is interested in potentially having more kids. For his ED he has tried Cialis 10 mg as needed and Viagra unknown dose with no success. I discussed that his ED is likely multifactorial.  His prior EtOH abuse, current buspirone and fluoxetine, other medical problems including hypertension hyperlipidemia, and low testosterone are all likely contributing factors. I spoke with him in detail regarding androgen replacement therapy with supplemental testosterone. I discussed the various methods of delivery including AndroGel, Testopel, testosterone injections. I discussed the risk of polycythemia and need for monitoring of blood work. I discussed that at this time there is not definitive evidence whether testosterone increases or decreases the risk of cardiovascular events. I discussed that research shows there is no increased risk of prostate cancer with ART. Lastly, I informed him that testosterone replacement therapy can cause infertility all on the medication if he is attempting to have children.    -Labs: Testosterone, free testosterone, LH, estradiol, hemoglobin/hematocrit  -Start Cialis 20 mg as needed  -I discussed that if his testosterone remains low and other labs are normal, since he is interested in having kids likely recommend Clomid as an option for testosterone replacement therapy    Thank you for this consult. I have personally reviewed all relevant medical records, labs, and imaging.     Medical Decision Making  Erectile dysfunction: New problem  Low testosterone: New problem    Amount and/or Complexity of Data Reviewed  External Data Reviewed: labs and notes. Labs: ordered. Risk  Prescription drug management. Daniela Rowe.  Venkat Ashley MD  Staff Urologist  [place] [id number]  Office: [phone]

## 2023-11-02 DIAGNOSIS — E78.2 MIXED HYPERLIPIDEMIA: ICD-10-CM

## 2023-11-02 RX ORDER — ATORVASTATIN CALCIUM 20 MG/1
20 TABLET, FILM COATED ORAL NIGHTLY
Qty: 90 TABLET | Refills: 0 | Status: SHIPPED | OUTPATIENT
Start: 2023-11-02

## 2023-11-06 NOTE — PROGRESS NOTES
Emanuel Marks,    I have reviewed your test results. Your testosterone is in the normal range. Your other blood work is normal as well. We can discuss further at your upcoming appointment with me on 11/24/2023. Please let me know if you have any questions.     Thanks,  Andres Lim MD

## 2023-11-08 ENCOUNTER — TELEPHONE (OUTPATIENT)
Dept: FAMILY MEDICINE CLINIC | Facility: CLINIC | Age: 33
End: 2023-11-08

## 2023-11-08 ENCOUNTER — MED REC SCAN ONLY (OUTPATIENT)
Dept: FAMILY MEDICINE CLINIC | Facility: CLINIC | Age: 33
End: 2023-11-08

## 2023-11-08 NOTE — TELEPHONE ENCOUNTER
Received fax on 11/8/2023 requesting medical records. Requesting medical records for may 1, 2023 to present. Any and all billing records, medical records, and radiology films/images. Name:Tiffanie Rivera   Address: [address] [facility]. [address] [place] [address] [phone]  Fax: [phone]    Original sent to [employer] [facility], copy sent to medical records.  Red Tag # [id number]

## 2023-11-24 ENCOUNTER — TELEMEDICINE (OUTPATIENT)
Dept: SURGERY | Facility: CLINIC | Age: 33
End: 2023-11-24

## 2023-11-24 DIAGNOSIS — N52.9 ERECTILE DYSFUNCTION OF ORGANIC ORIGIN: Primary | ICD-10-CM

## 2023-11-24 PROCEDURE — 99213 OFFICE O/P EST LOW 20 MIN: CPT | Performed by: SURGERY

## 2023-11-24 NOTE — PROGRESS NOTES
Urology Clinic Note  Telemedicine Visit  Audio & Video  The patient consented to performing this visit virtually. Primary Care Provider:  Edna Molina DO     Chief Complaint:   Testosterone deficiency, ED     HPI:   Malu Alarcon is a 26year old male with history of HTN, anxiety, depression, prior EtOH abuse (quit 2 years ago), prior robotic left pyeloplasty referred for testosterone deficiency and ED. His most recent testosterone on 7/31/2023 was 236. Prior to that on 2/26/2022 testosterone was 371. Most recent hematocrit on 2/26/2022 was 44.4. For the past 2 years since being sober he has noticed progressive erectile dysfunction, low libido, low energy. He just recently got  and he has been struggling to have sex. He does note mild dorsal penile curvature that does not prevent penetration. He does have 2 children 1 from a prior relationship and 1 from his current relationship, and he is interested in potentially having more kids. For his ED he has tried Cialis 10 mg as needed and Viagra unknown dose with no success. At his last visit with me on 10/31/2023, I started him on Cialis 20 mg as needed. I ordered labs that showed testosterone 382, normal estradiol, FSH, LH, prolactin, CBC. He just filled his prescription and is picking up the Cialis today, so he has not yet tried it. I discussed that since his testosterone and other labs are normal I would not recommend any testosterone replacement therapy at this time.     History:     Past Medical History:   Diagnosis Date    Alcohol abuse 2/8/2022    Anxiety     Asthma     Depression     Essential hypertension        Past Surgical History:   Procedure Laterality Date    KIDNEY SURGERY Left     Robotic Pyeloplasty for hydronephrosis    TONSILLECTOMY         Family History   Problem Relation Age of Onset    Diabetes Father     Hypertension Mother     Heart Disorder Paternal Grandmother        Social History     Socioeconomic History    Marital status:    Tobacco Use    Smoking status: Never    Smokeless tobacco: Current     Types: Chew    Tobacco comments:     Patient aware of increased risk of oral cancer with chew tobacco.   Vaping Use    Vaping Use: Never used   Substance and Sexual Activity    Alcohol use: Not Currently     Comment: recovering ETOH    Drug use: No    Sexual activity: Yes   Other Topics Concern    Caffeine Concern No    Exercise No    Seat Belt No    Special Diet No    Stress Concern Yes    Weight Concern No       Medications (Active prior to today's visit):  Current Outpatient Medications   Medication Sig Dispense Refill    atorvastatin 20 MG Oral Tab Take 1 tablet (20 mg total) by mouth nightly. 90 tablet 0    Tadalafil 20 MG Oral Tab Take 1 tablet (20 mg total) by mouth daily as needed for Erectile Dysfunction (Do not use consecutive days. ). 30 tablet 5    busPIRone 15 MG Oral Tab Take 1 tablet (15 mg total) by mouth 3 (three) times daily. 270 tablet 1    FLUoxetine 20 MG Oral Cap Take 1 capsule (20 mg total) by mouth daily. 90 capsule 0       Allergies: Allergies   Allergen Reactions    Cephalexin HIVES    Amoxil  [Amoxicillin]     Keflex     Penicillins     Cephalosporins RASH    Vancomycin RASH       Review of Systems:   A comprehensive 10-point review of systems was completed. Pertinent positives and negatives are noted in the the HPI. Physical Exam:   CONSTITUTIONAL: Well developed, well nourished, in no acute distress  NEUROLOGIC: Alert and oriented, normal speech  EYES: Sclera non-icteric  HEAD: Normocephalic, atraumatic  ENT: Hearing intact  RESPIRATORY: Normal respiratory effort    Assessment & Plan:   Margaret Mcdonald is a 26year old male with history of HTN, anxiety, depression, prior EtOH abuse (quit 2 years ago), prior robotic left pyeloplasty referred for testosterone deficiency and ED. His most recent testosterone on 7/31/2023 was 236. Prior to that on 2/26/2022 testosterone was 371. Most recent hematocrit on 2/26/2022 was 44.4. For the past 2 years since being sober he has noticed progressive erectile dysfunction, low libido, low energy. He just recently got  and he has been struggling to have sex. He does note mild dorsal penile curvature that does not prevent penetration. He does have 2 children 1 from a prior relationship and 1 from his current relationship, and he is interested in potentially having more kids. For his ED he has tried Cialis 10 mg as needed and Viagra unknown dose with no success. At his last visit with me on 10/31/2023, I started him on Cialis 20 mg as needed. I ordered labs that showed testosterone 382, normal estradiol, FSH, LH, prolactin, CBC. He just filled his prescription and is picking up the Cialis today, so he has not yet tried it. I discussed that since his testosterone and other labs are normal I would not recommend any testosterone replacement therapy at this time. In total, 20 minutes were spent on this patient encounter (including chart review, patient history, physical, and counseling, documentation, and communication). Clementina Verdugo.  Eufemia Hui MD  Staff Urologist  [facility]  Office: [phone]

## 2023-12-31 DIAGNOSIS — F33.41 RECURRENT MAJOR DEPRESSIVE DISORDER, IN PARTIAL REMISSION (HCC): ICD-10-CM

## 2023-12-31 DIAGNOSIS — F41.1 GENERALIZED ANXIETY DISORDER: ICD-10-CM

## 2024-01-02 RX ORDER — FLUOXETINE HYDROCHLORIDE 20 MG/1
20 CAPSULE ORAL DAILY
Qty: 90 CAPSULE | Refills: 0 | Status: SHIPPED | OUTPATIENT
Start: 2024-01-02

## 2024-02-04 DIAGNOSIS — E78.2 MIXED HYPERLIPIDEMIA: ICD-10-CM

## 2024-02-05 RX ORDER — ATORVASTATIN CALCIUM 20 MG/1
20 TABLET, FILM COATED ORAL NIGHTLY
Qty: 90 TABLET | Refills: 0 | Status: SHIPPED | OUTPATIENT
Start: 2024-02-05

## 2024-02-05 NOTE — TELEPHONE ENCOUNTER
Requested Prescriptions     Signed Prescriptions Disp Refills    ATORVASTATIN 20 MG Oral Tab 90 tablet 0     Sig: TAKE 1 TABLET(20 MG) BY MOUTH EVERY NIGHT     Authorizing Provider: LISA WELLS     Ordering User: EDUARDO LYNNE     Refilled per protocol/OV notes

## 2024-02-29 DIAGNOSIS — F41.1 GENERALIZED ANXIETY DISORDER: ICD-10-CM

## 2024-02-29 RX ORDER — BUSPIRONE HYDROCHLORIDE 15 MG/1
15 TABLET ORAL 3 TIMES DAILY
Qty: 90 TABLET | Refills: 0 | Status: SHIPPED | OUTPATIENT
Start: 2024-02-29

## 2024-02-29 NOTE — TELEPHONE ENCOUNTER
Front Office: Please schedule patient for OV before further refills will be given.    Requested Prescriptions     Signed Prescriptions Disp Refills    busPIRone 15 MG Oral Tab 90 tablet 0     Sig: Take 1 tablet (15 mg total) by mouth 3 (three) times daily.     Authorizing Provider: LISA WELLS     Ordering User: EDUARDO LYNNE     Refilled per protocol/OV notes

## 2024-03-01 NOTE — TELEPHONE ENCOUNTER
Called patient to inform needs appointment for future refills. Pt said he would use [facility] to schedule an appointment. Pt told Dr. Antoine is booking out until middle of April.     Pt voiced understanding.

## 2024-03-29 DIAGNOSIS — F33.41 RECURRENT MAJOR DEPRESSIVE DISORDER, IN PARTIAL REMISSION (HCC): ICD-10-CM

## 2024-03-29 DIAGNOSIS — F41.1 GENERALIZED ANXIETY DISORDER: ICD-10-CM

## 2024-04-01 RX ORDER — BUSPIRONE HYDROCHLORIDE 15 MG/1
15 TABLET ORAL 3 TIMES DAILY
Qty: 90 TABLET | Refills: 0 | Status: SHIPPED | OUTPATIENT
Start: 2024-04-01

## 2024-04-01 RX ORDER — FLUOXETINE HYDROCHLORIDE 20 MG/1
20 CAPSULE ORAL DAILY
Qty: 90 CAPSULE | Refills: 0 | Status: SHIPPED | OUTPATIENT
Start: 2024-04-01

## 2024-04-01 NOTE — TELEPHONE ENCOUNTER
Requested Prescriptions     Signed Prescriptions Disp Refills    BUSPIRONE 15 MG Oral Tab 90 tablet 0     Sig: TAKE 1 TABLET(15 MG) BY MOUTH THREE TIMES DAILY     Authorizing Provider: LISA WELLS     Ordering User: EDUARDO LYNNE    FLUOXETINE 20 MG Oral Cap 90 capsule 0     Sig: TAKE 1 CAPSULE(20 MG) BY MOUTH DAILY     Authorizing Provider: LISA WELLS     Ordering User: EDUARDO LYNNE     Refilled per protocol/OV notes

## 2024-04-08 ENCOUNTER — OFFICE VISIT (OUTPATIENT)
Dept: FAMILY MEDICINE CLINIC | Facility: CLINIC | Age: 34
End: 2024-04-08
Payer: COMMERCIAL

## 2024-04-08 VITALS
SYSTOLIC BLOOD PRESSURE: 100 MMHG | OXYGEN SATURATION: 99 % | HEIGHT: 71 IN | RESPIRATION RATE: 18 BRPM | BODY MASS INDEX: 30.1 KG/M2 | DIASTOLIC BLOOD PRESSURE: 60 MMHG | HEART RATE: 70 BPM | WEIGHT: 215 LBS | TEMPERATURE: 98 F

## 2024-04-08 DIAGNOSIS — F33.41 RECURRENT MAJOR DEPRESSIVE DISORDER, IN PARTIAL REMISSION (HCC): ICD-10-CM

## 2024-04-08 DIAGNOSIS — Z13.1 SCREENING FOR DIABETES MELLITUS: ICD-10-CM

## 2024-04-08 DIAGNOSIS — E78.2 MIXED HYPERLIPIDEMIA: Primary | ICD-10-CM

## 2024-04-08 DIAGNOSIS — F41.1 GENERALIZED ANXIETY DISORDER: ICD-10-CM

## 2024-04-08 DIAGNOSIS — Z00.00 LABORATORY EXAM ORDERED AS PART OF ROUTINE GENERAL MEDICAL EXAMINATION: ICD-10-CM

## 2024-04-08 DIAGNOSIS — E55.9 VITAMIN D DEFICIENCY: ICD-10-CM

## 2024-04-08 PROBLEM — F19.10 DRUG ABUSE (HCC): Status: RESOLVED | Noted: 2022-07-11 | Resolved: 2024-04-08

## 2024-04-08 PROCEDURE — 99214 OFFICE O/P EST MOD 30 MIN: CPT | Performed by: FAMILY MEDICINE

## 2024-04-08 RX ORDER — FLUOXETINE HYDROCHLORIDE 20 MG/1
20 CAPSULE ORAL DAILY
Qty: 90 CAPSULE | Refills: 0 | Status: CANCELLED | OUTPATIENT
Start: 2024-04-08

## 2024-04-08 RX ORDER — BUSPIRONE HYDROCHLORIDE 15 MG/1
15 TABLET ORAL 3 TIMES DAILY
Qty: 90 TABLET | Refills: 0 | Status: CANCELLED | OUTPATIENT
Start: 2024-04-08

## 2024-04-08 RX ORDER — ATORVASTATIN CALCIUM 20 MG/1
20 TABLET, FILM COATED ORAL NIGHTLY
Qty: 90 TABLET | Refills: 0 | Status: SHIPPED | OUTPATIENT
Start: 2024-04-08

## 2024-04-08 NOTE — PATIENT INSTRUCTIONS
Go to the [facility] lab one week before your physical for your fasting blood tests. Do not eat or drink except for water for at least 8 hours prior to the blood tests. Do not take any vitamins or Biotin for 3 days before your blood tests.    Continue the Atorvastatin and Fluoxetine as prescribed.    Decrease the buspirone to twice daily for 4 weeks and if you are doing well, then decrease to once daily.    See me in July for your annual physical.

## 2024-04-08 NOTE — PROGRESS NOTES
The 21st Century Cures Act makes medical notes like these available to patients in the interest of transparency. Please be advised this is a medical document. Medical documents are intended to carry relevant information, facts as evident, and the clinical opinion of the practitioner. The medical note is intended as peer to peer communication and may appear blunt or direct. It is written in medical language and may contain abbreviations or verbiage that are unfamiliar.       Kendrick Davis is a 33 year old male.    HPI:     Chief Complaint   Patient presents with    Follow - Up       This 33-year-old male with history for hyperlipidemia, anxiety and depression presents to the office for reassessment and refill on his medications.  The patient states his anxiety and depression has been well-controlled on this medication and he would like to start tapering his dose.  He has questions about how to do this.  He denies any SI or HI. He has no chest pain, shortness of breath, dizziness, lightheadedness, leg swelling.  In general, he is feeling well.  He states he has not drunk any alcohol in the last 2 years.  He states he was drinking a lot of soda but he has cut back significantly.    HISTORY:  Past Medical History:   Diagnosis Date    Alcohol abuse 02/08/2022    Anxiety     Asthma (HCC)     Depression     Drug abuse (HCC) 07/11/2022    Essential hypertension       Past Surgical History:   Procedure Laterality Date    KIDNEY SURGERY Left     Robotic Pyeloplasty for hydronephrosis    TONSILLECTOMY        Family History   Problem Relation Age of Onset    Diabetes Father     Hypertension Mother     Heart Disorder Paternal Grandmother       Social History:   Social History     Socioeconomic History    Marital status:    Tobacco Use    Smoking status: Never    Smokeless tobacco: Current     Types: Chew    Tobacco comments:     Patient aware of increased risk of oral cancer with chew tobacco.   Vaping Use    Vaping  Use: Never used   Substance and Sexual Activity    Alcohol use: Not Currently     Comment: Addiction recovery    Drug use: No    Sexual activity: Yes   Other Topics Concern    Caffeine Concern No    Exercise No    Seat Belt No    Special Diet No    Stress Concern Yes    Weight Concern No        Medications (Active prior to today's visit):  Current Outpatient Medications   Medication Sig Dispense Refill    atorvastatin 20 MG Oral Tab Take 1 tablet (20 mg total) by mouth nightly. 90 tablet 0    FLUOXETINE 20 MG Oral Cap TAKE 1 CAPSULE(20 MG) BY MOUTH DAILY 90 capsule 0    Tadalafil 20 MG Oral Tab Take 1 tablet (20 mg total) by mouth daily as needed for Erectile Dysfunction (Do not use consecutive days.). 30 tablet 5    BUSPIRONE 15 MG Oral Tab TAKE 1 TABLET(15 MG) BY MOUTH THREE TIMES DAILY 90 tablet 0       Allergies:  Allergies   Allergen Reactions    Cephalexin HIVES    Amoxil  [Amoxicillin]     Keflex     Penicillins     Cephalosporins RASH    Vancomycin RASH       ROS:     Pertinent positives and pertinent negatives are as listed in HPI.    All other review of symptoms were reviewed and negative.    PHYSICAL EXAM:   /60 (BP Location: Left arm, Patient Position: Sitting, Cuff Size: adult)   Pulse 70   Temp 98.2 °F (36.8 °C)   Resp 18   Ht 5' 11\" (1.803 m)   Wt 215 lb (97.5 kg)   SpO2 99%   BMI 29.99 kg/m²     Wt Readings from Last 3 Encounters:   04/08/24 215 lb (97.5 kg)   07/31/23 218 lb 6.4 oz (99.1 kg)   06/14/23 225 lb (102.1 kg)       BP Readings from Last 3 Encounters:   04/08/24 100/60   07/31/23 130/80   06/14/23 112/70     Weight is down 10 # from 6/14/2023 OV    General: Well-nourished, well hydrated. No acute distress. No pallor.   HEENT: Normocephalic, atraumatic.  KAREN, EOMI, Sclera clear and non icteric bilaterally. TM's normal, nose without congestion, pharynx without redness or exudates. Moist mucous membranes.  Neck: Supple. No lymphadenopathy. No thyromegaly. No bruits  noted.  Heart: RRR without S3 or S4 or murmur.  Lungs: Clear to auscultation bilaterally. No rales, rhonchi or wheezes. No tachypnea or retractions noted.  Abdomen: Soft, nontender, nondistended, normal bowel sounds. No organomegaly. No guarding, rigidity or rebound noted.  Extremities: No edema bilaterally.  Skin: Warm and dry. Multiple tattoos noted.  Neuro: Alert and oriented x 3, normal gait.  Psych: Normal mood and affect.     ASSESSMENT/PLAN:   33 year old male with        1. Mixed hyperlipidemia    Cholesterol:     Lab Results   Component Value Date    CHOLEST 249 (H) 07/31/2023    CHOLEST 239.00 (H) 02/26/2022    CHOLEST 265 (H) 11/25/2019     Lab Results   Component Value Date    HDL 43 07/31/2023    HDL 50 02/26/2022    HDL 69 (H) 11/25/2019     Lab Results   Component Value Date    TRIG 213 (H) 07/31/2023    TRIG 73.00 02/26/2022    TRIG 164 (H) 11/25/2019     Lab Results   Component Value Date     (H) 07/31/2023     (H) 02/26/2022     (H) 11/25/2019     Lab Results   Component Value Date    AST 21 07/31/2023    AST 19 02/26/2022    AST 37 11/25/2019     Lab Results   Component Value Date    ALT 34 07/31/2023    ALT 23 02/26/2022    ALT 39 11/25/2019     Patient is due for recheck on his lipid panel.  I refilled his atorvastatin.    - atorvastatin 20 MG Oral Tab; Take 1 tablet (20 mg total) by mouth nightly.  Dispense: 90 tablet; Refill: 0    2. Recurrent major depressive disorder, in partial remission (HCC)  3. Generalized anxiety disorder    PHQ-9 score today is 1.  I discussed how we will stop the buspirone first.  He can decrease his buspirone to 15 mg twice daily for the next 4 weeks.  If he is doing well then, we can decrease it to just 1 tablet daily.  He is to continue with the fluoxetine 20 mg daily.  I refilled these prescriptions on 4/1/2024, so the patient should check with his pharmacy.    4. Vitamin D deficiency    His last vitamin D level was 29.9.  He is due for  recheck on his vitamin D.    - VITAMIN D, 25-HYDROXY [36628][Q]; Future    5. Laboratory exam ordered as part of routine general medical examination    - CBC With Differential With Platelet; Future  - Comp Metabolic Panel (14); Future  - Lipid Panel; Future  - TSH W Reflex To Free T4; Future    6. Screening for diabetes mellitus    Last glucose was 128.  He is due for recheck on his hemoglobin A1c.    - HGB A1C [496] [Q]; Future     7.  Health maintenance    The patient is to return in July for his annual wellness exam    Health Maintenance:    Health Maintenance   Topic Date Due    COVID-19 Vaccine (1 - 2023-24 season) Never done    Annual Depression Screening  01/01/2024    Annual Physical  07/31/2024    Influenza Vaccine (Season Ended) 10/01/2024    DTaP,Tdap,and Td Vaccines (8 - Td or Tdap) 07/11/2032    Pneumococcal Vaccine: Birth to 64yrs  Aged Out         Meds This Visit:  Requested Prescriptions     Signed Prescriptions Disp Refills    atorvastatin 20 MG Oral Tab 90 tablet 0     Sig: Take 1 tablet (20 mg total) by mouth nightly.       Imaging & Referrals:  None     Patient understands plan and follow-up.  FU in 7/2024 for annual physical.    4/8/2024  Omayra Antoine DO    Total time: 30 minutes including precharting, H&P, plan of care    This dictation was performed with a verbal recognition program (DRAGON) and it was checked for errors. It is possible that there are still dictated errors within this office note. If so, please bring any errors to my attention for an addendum. All efforts were made to ensure that this office note is accurate

## 2024-04-09 ENCOUNTER — TELEPHONE (OUTPATIENT)
Dept: FAMILY MEDICINE CLINIC | Facility: CLINIC | Age: 34
End: 2024-04-09

## 2024-04-09 NOTE — TELEPHONE ENCOUNTER
PA initiated for atorvastatin. Not needed as the pharmacy ran the claim yesterday and it went thru

## 2024-04-16 ENCOUNTER — TELEPHONE (OUTPATIENT)
Dept: FAMILY MEDICINE CLINIC | Facility: CLINIC | Age: 34
End: 2024-04-16

## 2024-05-05 DIAGNOSIS — F41.1 GENERALIZED ANXIETY DISORDER: ICD-10-CM

## 2024-05-06 RX ORDER — BUSPIRONE HYDROCHLORIDE 15 MG/1
15 TABLET ORAL 3 TIMES DAILY
Qty: 270 TABLET | Refills: 0 | Status: SHIPPED | OUTPATIENT
Start: 2024-05-06

## 2024-05-07 NOTE — TELEPHONE ENCOUNTER
Future Appointments   Date Time Provider Department Center   7/31/2024  9:00 AM Omayra Antoine DO [facility]

## 2024-05-13 NOTE — TELEPHONE ENCOUNTER
Please schedule the patient for his annual physical in July.   [FreeTextEntry1] : Patient with above hx   s/p syncopal episode :  poor nutritional status , likely dehydration causing his symptom however he was noted to have moderate to severe AS  could be contributing factor ,improved BP off of  flomax  , would advise the patient to maintain hydration , discontinued flomax    Valvular heart disease moderate to severe AS , moderate AI , Mild MR , TR  will continue to monitor with periodic echocardiogram   HLD : continue atorvastatin 10 mg po daily  HTN :  low normal range , continue low dose BB ,  discontinued  flomax  , continue finasteride   GERD" continue pepsid    hx of follicular lymphoma  s/p recent completion of chemotherapy   patient is overall stable for low risk dental extraction procedure , can use xylocaine avoid epinephrine   [EKG obtained to assist in diagnosis and management of assessed problem(s)] : EKG obtained to assist in diagnosis and management of assessed problem(s)

## 2024-07-04 DIAGNOSIS — F33.41 RECURRENT MAJOR DEPRESSIVE DISORDER, IN PARTIAL REMISSION (HCC): ICD-10-CM

## 2024-07-04 DIAGNOSIS — F41.1 GENERALIZED ANXIETY DISORDER: ICD-10-CM

## 2024-07-05 RX ORDER — FLUOXETINE HYDROCHLORIDE 20 MG/1
20 CAPSULE ORAL DAILY
Qty: 90 CAPSULE | Refills: 0 | Status: SHIPPED | OUTPATIENT
Start: 2024-07-05

## 2024-07-31 ENCOUNTER — OFFICE VISIT (OUTPATIENT)
Dept: FAMILY MEDICINE CLINIC | Facility: CLINIC | Age: 34
End: 2024-07-31
Payer: COMMERCIAL

## 2024-07-31 ENCOUNTER — LAB ENCOUNTER (OUTPATIENT)
Dept: LAB | Age: 34
End: 2024-07-31
Attending: FAMILY MEDICINE
Payer: COMMERCIAL

## 2024-07-31 VITALS
HEART RATE: 88 BPM | DIASTOLIC BLOOD PRESSURE: 60 MMHG | WEIGHT: 190 LBS | BODY MASS INDEX: 27.2 KG/M2 | HEIGHT: 70 IN | SYSTOLIC BLOOD PRESSURE: 108 MMHG

## 2024-07-31 DIAGNOSIS — R73.9 HYPERGLYCEMIA: ICD-10-CM

## 2024-07-31 DIAGNOSIS — Z00.00 LABORATORY EXAM ORDERED AS PART OF ROUTINE GENERAL MEDICAL EXAMINATION: ICD-10-CM

## 2024-07-31 DIAGNOSIS — E55.9 VITAMIN D DEFICIENCY: ICD-10-CM

## 2024-07-31 DIAGNOSIS — F33.42 RECURRENT MAJOR DEPRESSIVE DISORDER, IN FULL REMISSION (HCC): ICD-10-CM

## 2024-07-31 DIAGNOSIS — Z13.1 SCREENING FOR DIABETES MELLITUS: ICD-10-CM

## 2024-07-31 DIAGNOSIS — E78.2 MIXED HYPERLIPIDEMIA: ICD-10-CM

## 2024-07-31 DIAGNOSIS — F41.1 GENERALIZED ANXIETY DISORDER: ICD-10-CM

## 2024-07-31 DIAGNOSIS — Z00.00 WELLNESS EXAMINATION: Primary | ICD-10-CM

## 2024-07-31 DIAGNOSIS — Z71.6 ENCOUNTER FOR TOBACCO USE CESSATION COUNSELING: ICD-10-CM

## 2024-07-31 PROBLEM — T30.4 CHEMICAL BURN: Status: RESOLVED | Noted: 2022-07-11 | Resolved: 2024-07-31

## 2024-07-31 PROBLEM — N52.9 ERECTILE DYSFUNCTION: Status: RESOLVED | Noted: 2023-04-07 | Resolved: 2024-07-31

## 2024-07-31 PROBLEM — T25.029A BURN OF FOOT: Status: RESOLVED | Noted: 2022-07-11 | Resolved: 2024-07-31

## 2024-07-31 PROBLEM — R50.9 FEVER: Status: RESOLVED | Noted: 2022-07-11 | Resolved: 2024-07-31

## 2024-07-31 LAB
ALBUMIN SERPL-MCNC: 4.8 G/DL (ref 3.2–4.8)
ALBUMIN/GLOB SERPL: 1.8 {RATIO} (ref 1–2)
ALP LIVER SERPL-CCNC: 82 U/L
ALT SERPL-CCNC: 22 U/L
ANION GAP SERPL CALC-SCNC: 5 MMOL/L (ref 0–18)
AST SERPL-CCNC: 22 U/L (ref ?–34)
BASOPHILS # BLD AUTO: 0.04 X10(3) UL (ref 0–0.2)
BASOPHILS NFR BLD AUTO: 0.6 %
BILIRUB SERPL-MCNC: 1.2 MG/DL (ref 0.3–1.2)
BUN BLD-MCNC: 11 MG/DL (ref 9–23)
CALCIUM BLD-MCNC: 10.1 MG/DL (ref 8.7–10.4)
CHLORIDE SERPL-SCNC: 106 MMOL/L (ref 98–112)
CHOLEST SERPL-MCNC: 125 MG/DL (ref ?–200)
CO2 SERPL-SCNC: 30 MMOL/L (ref 21–32)
CREAT BLD-MCNC: 1.03 MG/DL
EGFRCR SERPLBLD CKD-EPI 2021: 97 ML/MIN/1.73M2 (ref 60–?)
EOSINOPHIL # BLD AUTO: 0.13 X10(3) UL (ref 0–0.7)
EOSINOPHIL NFR BLD AUTO: 1.9 %
ERYTHROCYTE [DISTWIDTH] IN BLOOD BY AUTOMATED COUNT: 13 %
EST. AVERAGE GLUCOSE BLD GHB EST-MCNC: 97 MG/DL (ref 68–126)
FASTING PATIENT LIPID ANSWER: YES
FASTING STATUS PATIENT QL REPORTED: YES
GLOBULIN PLAS-MCNC: 2.7 G/DL (ref 2–3.5)
GLUCOSE BLD-MCNC: 109 MG/DL (ref 70–99)
HBA1C MFR BLD: 5 % (ref ?–5.7)
HCT VFR BLD AUTO: 43.1 %
HDLC SERPL-MCNC: 43 MG/DL (ref 40–59)
HGB BLD-MCNC: 14.9 G/DL
IMM GRANULOCYTES # BLD AUTO: 0.01 X10(3) UL (ref 0–1)
IMM GRANULOCYTES NFR BLD: 0.1 %
LDLC SERPL CALC-MCNC: 67 MG/DL (ref ?–100)
LYMPHOCYTES # BLD AUTO: 2.36 X10(3) UL (ref 1–4)
LYMPHOCYTES NFR BLD AUTO: 34.6 %
MCH RBC QN AUTO: 28.4 PG (ref 26–34)
MCHC RBC AUTO-ENTMCNC: 34.6 G/DL (ref 31–37)
MCV RBC AUTO: 82.3 FL
MONOCYTES # BLD AUTO: 0.46 X10(3) UL (ref 0.1–1)
MONOCYTES NFR BLD AUTO: 6.7 %
NEUTROPHILS # BLD AUTO: 3.82 X10 (3) UL (ref 1.5–7.7)
NEUTROPHILS # BLD AUTO: 3.82 X10(3) UL (ref 1.5–7.7)
NEUTROPHILS NFR BLD AUTO: 56.1 %
NONHDLC SERPL-MCNC: 82 MG/DL (ref ?–130)
OSMOLALITY SERPL CALC.SUM OF ELEC: 292 MOSM/KG (ref 275–295)
PLATELET # BLD AUTO: 267 10(3)UL (ref 150–450)
POTASSIUM SERPL-SCNC: 4.2 MMOL/L (ref 3.5–5.1)
PROT SERPL-MCNC: 7.5 G/DL (ref 5.7–8.2)
RBC # BLD AUTO: 5.24 X10(6)UL
SODIUM SERPL-SCNC: 141 MMOL/L (ref 136–145)
TRIGL SERPL-MCNC: 74 MG/DL (ref 30–149)
TSI SER-ACNC: 1.08 MIU/ML (ref 0.55–4.78)
VIT D+METAB SERPL-MCNC: 41.7 NG/ML (ref 30–100)
VLDLC SERPL CALC-MCNC: 11 MG/DL (ref 0–30)
WBC # BLD AUTO: 6.8 X10(3) UL (ref 4–11)

## 2024-07-31 PROCEDURE — 84443 ASSAY THYROID STIM HORMONE: CPT

## 2024-07-31 PROCEDURE — 83036 HEMOGLOBIN GLYCOSYLATED A1C: CPT

## 2024-07-31 PROCEDURE — 36415 COLL VENOUS BLD VENIPUNCTURE: CPT

## 2024-07-31 PROCEDURE — 82306 VITAMIN D 25 HYDROXY: CPT

## 2024-07-31 PROCEDURE — 99395 PREV VISIT EST AGE 18-39: CPT | Performed by: FAMILY MEDICINE

## 2024-07-31 PROCEDURE — 99214 OFFICE O/P EST MOD 30 MIN: CPT | Performed by: FAMILY MEDICINE

## 2024-07-31 PROCEDURE — 85025 COMPLETE CBC W/AUTO DIFF WBC: CPT

## 2024-07-31 PROCEDURE — 80061 LIPID PANEL: CPT

## 2024-07-31 PROCEDURE — 80053 COMPREHEN METABOLIC PANEL: CPT

## 2024-07-31 RX ORDER — ATORVASTATIN CALCIUM 20 MG/1
20 TABLET, FILM COATED ORAL NIGHTLY
Qty: 90 TABLET | Refills: 3 | Status: SHIPPED | OUTPATIENT
Start: 2024-07-31

## 2024-07-31 RX ORDER — BUSPIRONE HYDROCHLORIDE 15 MG/1
15 TABLET ORAL 3 TIMES DAILY
Qty: 270 TABLET | Refills: 3 | Status: SHIPPED | OUTPATIENT
Start: 2024-07-31

## 2024-07-31 RX ORDER — FLUOXETINE HYDROCHLORIDE 20 MG/1
20 CAPSULE ORAL DAILY
Qty: 90 CAPSULE | Refills: 3 | Status: SHIPPED | OUTPATIENT
Start: 2024-07-31
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
## 2024-07-31 NOTE — PATIENT INSTRUCTIONS
Continue your medications as prescribed.    For premenopausal women and men, 1000 mg of calcium daily is recommended. For postmenopausal women, 1200 mg of calcium daily is recommended.    To help the body absorb and use calcium, vitamin D 2000 international units daily is recommended.    Get your COVID vaccine.    Stop chewing tobacco. This increases your risk for oral, head and neck cancer.    I will contact you with your test results once available.

## 2025-01-07 ENCOUNTER — TELEPHONE (OUTPATIENT)
Age: 35
End: 2025-01-07

## 2025-01-07 NOTE — TELEPHONE ENCOUNTER
Hello,    Sorry I missed you - I am reaching out from the [facility] Navigation department, following up on an order from your provider's office to assist in connecting you with resources for care. If you would like to discuss this further, please give us a call back at [phone], or for more immediate assistance you can contact our 24-hour help line at [phone]. We look forward to hearing from you soon.

## 2025-01-08 ENCOUNTER — TELEPHONE (OUTPATIENT)
Age: 35
End: 2025-01-08

## 2025-01-08 NOTE — TELEPHONE ENCOUNTER
Hello,     The [facility] Navigation team has attempted to reach you regarding an order from Dr. Antoine's office. We are reaching out in order to assist you in coordinating care and resources that may meet your needs. Please give our office a call at [phone]. For more immediate assistance you can contact our 24-hour help line at [phone]. We look forward to hearing from you soon.

## 2025-01-08 NOTE — TELEPHONE ENCOUNTER
Hello  Thank you for speaking with me today. Below are the neuropsychologist that might be a good fit and that are showing in network with your insurance. Please call the provider directly to schedule an appointment. If distance is a concern please inquire on virtual service options.     [facility] Neurobehavioral Services  [address]  [address]  [facility] [address]  Phone: [phone]  [email or web address]    Dr. Wetzel   [address],   [place], [place], [address]  [phone]  [email or web address]    [facility]  [address]  [place] [address].  Phone: [phone]  [email or web address]    [facility]  [address]  [address]  [place] [address]  Phone: [phone]  [email or web address]    [facility] Neuro[facility]  Dr. Moe Cordero   [address]  [place] [address]  [phone]  [email or web address]    [facility]   Farhan Rosenberg  [address], [place], [address]  Phone: [phone]  [email or web address]    Warm regards,     If any safety concerns arise it is advised to call 911, go to the nearest emergency room. [facility] crisis walk-in facility is located at [address] in [place]. Contact Number for [facility] is [phone].    Lenore Lopez LCPC  (she/her/hers)  Lead Patient Care Navigator Mental Health   [facility]- Behavioral Health Service Line    [email or web address]  [phone]  work  [email or web address]  Request an assessment or support »

## 2025-04-08 DIAGNOSIS — F41.1 GENERALIZED ANXIETY DISORDER: ICD-10-CM

## 2025-04-08 RX ORDER — BUSPIRONE HYDROCHLORIDE 15 MG/1
15 TABLET ORAL 3 TIMES DAILY
Qty: 270 TABLET | Refills: 3 | Status: SHIPPED | OUTPATIENT
Start: 2025-04-08

## 2025-04-08 NOTE — TELEPHONE ENCOUNTER
Requested Prescriptions     Pending Prescriptions Disp Refills    busPIRone 15 MG Oral Tab 270 tablet 3     Sig: Take 1 tablet (15 mg total) by mouth 3 (three) times daily.       Last Refill: 7/31/24    Last OV: 7/31/24

## 2025-04-08 NOTE — TELEPHONE ENCOUNTER
Future Appointments   Date Time Provider Department Center   7/31/2025  1:00 PM Omayra Antoine DO [facility] [place]

## 2025-07-31 ENCOUNTER — OFFICE VISIT (OUTPATIENT)
Dept: FAMILY MEDICINE CLINIC | Facility: CLINIC | Age: 35
End: 2025-07-31
Payer: COMMERCIAL

## 2025-07-31 VITALS
SYSTOLIC BLOOD PRESSURE: 98 MMHG | OXYGEN SATURATION: 99 % | WEIGHT: 179 LBS | HEIGHT: 70 IN | DIASTOLIC BLOOD PRESSURE: 60 MMHG | TEMPERATURE: 98 F | BODY MASS INDEX: 25.62 KG/M2 | RESPIRATION RATE: 18 BRPM

## 2025-07-31 DIAGNOSIS — Z13.1 SCREENING FOR DIABETES MELLITUS: ICD-10-CM

## 2025-07-31 DIAGNOSIS — F12.90 CURRENT EVERY DAY CANNABIS VAPING: ICD-10-CM

## 2025-07-31 DIAGNOSIS — E78.2 MIXED HYPERLIPIDEMIA: ICD-10-CM

## 2025-07-31 DIAGNOSIS — F33.42 RECURRENT MAJOR DEPRESSIVE DISORDER, IN FULL REMISSION: ICD-10-CM

## 2025-07-31 DIAGNOSIS — F41.1 GENERALIZED ANXIETY DISORDER: ICD-10-CM

## 2025-07-31 DIAGNOSIS — E55.9 VITAMIN D INSUFFICIENCY: ICD-10-CM

## 2025-07-31 DIAGNOSIS — Z00.00 WELLNESS EXAMINATION: Primary | ICD-10-CM

## 2025-07-31 DIAGNOSIS — Z72.0 CHEWS TOBACCO: ICD-10-CM

## 2025-07-31 DIAGNOSIS — E55.9 VITAMIN D DEFICIENCY: ICD-10-CM

## 2025-07-31 DIAGNOSIS — Z00.00 LABORATORY EXAM ORDERED AS PART OF ROUTINE GENERAL MEDICAL EXAMINATION: ICD-10-CM

## 2025-07-31 DIAGNOSIS — Z71.6 ENCOUNTER FOR TOBACCO USE CESSATION COUNSELING: ICD-10-CM

## 2025-07-31 PROBLEM — R73.9 HYPERGLYCEMIA: Status: RESOLVED | Noted: 2023-08-01 | Resolved: 2025-07-31

## 2025-07-31 PROBLEM — R79.89 LOW TESTOSTERONE IN MALE: Status: RESOLVED | Noted: 2023-08-07 | Resolved: 2025-07-31

## 2025-07-31 PROCEDURE — 99213 OFFICE O/P EST LOW 20 MIN: CPT | Performed by: FAMILY MEDICINE

## 2025-07-31 PROCEDURE — 99395 PREV VISIT EST AGE 18-39: CPT | Performed by: FAMILY MEDICINE

## 2025-07-31 RX ORDER — BUSPIRONE HYDROCHLORIDE 15 MG/1
15 TABLET ORAL 2 TIMES DAILY
COMMUNITY
Start: 2025-07-31

## (undated) NOTE — LETTER
[address] [facility], [address] [place], [place] [address] [phone]               2020      RE: Dee Baumgarten  : 08/15/1990      Dear Employer,  At [place] [address], we are taking special preca

## (undated) NOTE — LETTER
Date & Time: 3/31/2020, 3:01 PM  Patient: Marc Hagen  Encounter Provider(s):    MD Annie Lao [place]       To Whom It May Concern:    Charo Lim was seen and treated in our department on 3/31/2020.   Patient must remain in [place]

## (undated) NOTE — LETTER
Date & Time: 3/31/2020, 3:02 PM  Patient: Jessica Yanez  Encounter Provider(s):    MD Ricardo Aranda [place]       To Whom It May Concern:    Husam Brenner was seen and treated in our department on 3/31/2020.   Patient must remain in [place]

## (undated) NOTE — LETTER
Date: 4/2/2020    Patient Name: Joey Fermin          To Whom it may concern:      Mr. Dean Sesay may return to work on 4/2/20 without restrictions.          Sincerely,    Mo Loving MD